# Patient Record
Sex: MALE | Race: WHITE | NOT HISPANIC OR LATINO | Employment: FULL TIME | URBAN - METROPOLITAN AREA
[De-identification: names, ages, dates, MRNs, and addresses within clinical notes are randomized per-mention and may not be internally consistent; named-entity substitution may affect disease eponyms.]

---

## 2018-07-02 ENCOUNTER — HOSPITAL ENCOUNTER (EMERGENCY)
Facility: HOSPITAL | Age: 43
Discharge: HOME | End: 2018-07-02
Attending: EMERGENCY MEDICINE
Payer: COMMERCIAL

## 2018-07-02 VITALS
OXYGEN SATURATION: 95 % | HEART RATE: 87 BPM | BODY MASS INDEX: 44.1 KG/M2 | HEIGHT: 71 IN | RESPIRATION RATE: 20 BRPM | DIASTOLIC BLOOD PRESSURE: 83 MMHG | WEIGHT: 315 LBS | TEMPERATURE: 98 F | SYSTOLIC BLOOD PRESSURE: 136 MMHG

## 2018-07-02 DIAGNOSIS — D58.2 ELEVATED HEMOGLOBIN: ICD-10-CM

## 2018-07-02 DIAGNOSIS — R71.8 ELEVATED HEMATOCRIT: ICD-10-CM

## 2018-07-02 DIAGNOSIS — R10.31 RLQ ABDOMINAL PAIN: Primary | ICD-10-CM

## 2018-07-02 DIAGNOSIS — R71.8 ELEVATED RED BLOOD CELL COUNT: ICD-10-CM

## 2018-07-02 LAB
ALBUMIN SERPL BCP-MCNC: 3.8 G/DL
ALP SERPL-CCNC: 70 U/L
ALT SERPL W/O P-5'-P-CCNC: 46 U/L
ANION GAP SERPL CALC-SCNC: 11 MMOL/L
AST SERPL-CCNC: 32 U/L
BASOPHILS # BLD AUTO: 0.03 K/UL
BASOPHILS NFR BLD: 0.2 %
BILIRUB SERPL-MCNC: 0.5 MG/DL
BILIRUB UR QL STRIP: NEGATIVE
BUN SERPL-MCNC: 9 MG/DL
CALCIUM SERPL-MCNC: 9.9 MG/DL
CHLORIDE SERPL-SCNC: 104 MMOL/L
CLARITY UR: CLEAR
CO2 SERPL-SCNC: 24 MMOL/L
COLOR UR: YELLOW
CREAT SERPL-MCNC: 0.9 MG/DL
DIFFERENTIAL METHOD: ABNORMAL
EOSINOPHIL # BLD AUTO: 0.2 K/UL
EOSINOPHIL NFR BLD: 1.8 %
ERYTHROCYTE [DISTWIDTH] IN BLOOD BY AUTOMATED COUNT: 13.4 %
EST. GFR  (AFRICAN AMERICAN): >60 ML/MIN/1.73 M^2
EST. GFR  (NON AFRICAN AMERICAN): >60 ML/MIN/1.73 M^2
GLUCOSE SERPL-MCNC: 127 MG/DL
GLUCOSE UR QL STRIP: NEGATIVE
HCT VFR BLD AUTO: 55.2 %
HGB BLD-MCNC: 18.9 G/DL
HGB UR QL STRIP: NEGATIVE
KETONES UR QL STRIP: NEGATIVE
LEUKOCYTE ESTERASE UR QL STRIP: NEGATIVE
LYMPHOCYTES # BLD AUTO: 3.4 K/UL
LYMPHOCYTES NFR BLD: 28.3 %
MCH RBC QN AUTO: 29.3 PG
MCHC RBC AUTO-ENTMCNC: 34.2 G/DL
MCV RBC AUTO: 86 FL
MONOCYTES # BLD AUTO: 0.4 K/UL
MONOCYTES NFR BLD: 3.2 %
NEUTROPHILS # BLD AUTO: 8 K/UL
NEUTROPHILS NFR BLD: 66.5 %
NITRITE UR QL STRIP: NEGATIVE
PH UR STRIP: 6 [PH] (ref 5–8)
PLATELET # BLD AUTO: 160 K/UL
PMV BLD AUTO: 11.5 FL
POTASSIUM SERPL-SCNC: 4 MMOL/L
PROT SERPL-MCNC: 7.7 G/DL
PROT UR QL STRIP: NEGATIVE
RBC # BLD AUTO: 6.45 M/UL
SODIUM SERPL-SCNC: 139 MMOL/L
SP GR UR STRIP: 1.02 (ref 1–1.03)
URN SPEC COLLECT METH UR: ABNORMAL
UROBILINOGEN UR STRIP-ACNC: ABNORMAL EU/DL
WBC # BLD AUTO: 12.05 K/UL

## 2018-07-02 PROCEDURE — 63600175 PHARM REV CODE 636 W HCPCS: Performed by: PHYSICIAN ASSISTANT

## 2018-07-02 PROCEDURE — 81003 URINALYSIS AUTO W/O SCOPE: CPT

## 2018-07-02 PROCEDURE — 80053 COMPREHEN METABOLIC PANEL: CPT

## 2018-07-02 PROCEDURE — 99284 EMERGENCY DEPT VISIT MOD MDM: CPT | Mod: 25

## 2018-07-02 PROCEDURE — 85025 COMPLETE CBC W/AUTO DIFF WBC: CPT

## 2018-07-02 PROCEDURE — 96374 THER/PROPH/DIAG INJ IV PUSH: CPT

## 2018-07-02 RX ORDER — PANTOPRAZOLE SODIUM 40 MG/1
40 TABLET, DELAYED RELEASE ORAL DAILY
COMMUNITY

## 2018-07-02 RX ORDER — KETOROLAC TROMETHAMINE 30 MG/ML
30 INJECTION, SOLUTION INTRAMUSCULAR; INTRAVENOUS
Status: COMPLETED | OUTPATIENT
Start: 2018-07-02 | End: 2018-07-02

## 2018-07-02 RX ADMIN — KETOROLAC TROMETHAMINE 30 MG: 30 INJECTION, SOLUTION INTRAMUSCULAR at 09:07

## 2018-07-02 NOTE — ED PROVIDER NOTES
Encounter Date: 7/2/2018    SCRIBE #1 NOTE: I, Bere Callahan, am scribing for, and in the presence of,  Tonia Sheikh PA-C. I have scribed the following portions of the note - Other sections scribed: HPI and ROS.       History     Chief Complaint   Patient presents with    Abdominal Pain     RLQ abdominal pain that woke him up. Denies pain at this time     CC: Abdominal Pain    HPI: This 42 y.o male who has GERD presents to the ED for an evaluation of acute onset right lower quadrant abdominal pain that began this evening.  Patient reports napping on his sofa, when he leaned back and suddenly felt a sharp pain to his right lower abdomen.  Patient rates the pain as a 10/10.  Patient reports palpating the area, when he noticed a pimple to skin of his right lower abdomen.  Patient reports squeezing the area and being able to express pus-like drainage from the pimple.  Patient reports upon arrival to the ED, he pain has started to subside.  Patient denies fever, chills, nausea, emesis, diarrhea, constipation, blood in stool, back pain, dysuria, hematuria, or any other associated symptoms.  Patient reports recently having a colonoscopy performed a few weeks ago, in which he reports the removal of 2 colon polyps.  He reports the polyps were benign.  No prior tx.        The history is provided by the patient. No  was used.     Review of patient's allergies indicates:  No Known Allergies  Past Medical History:   Diagnosis Date    GERD (gastroesophageal reflux disease)      History reviewed. No pertinent surgical history.  Family History   Problem Relation Age of Onset    Cancer Mother     Cancer Father     Cancer Brother      Social History   Substance Use Topics    Smoking status: Former Smoker     Packs/day: 1.00     Types: Cigarettes    Smokeless tobacco: Not on file    Alcohol use 0.5 oz/week     1 Standard drinks or equivalent per week     Review of Systems   Constitutional: Negative for  chills and fever.   HENT: Negative for ear pain and sore throat.    Eyes: Negative for pain.   Respiratory: Negative for cough and shortness of breath.    Cardiovascular: Negative for chest pain.   Gastrointestinal: Positive for abdominal pain. Negative for blood in stool, constipation, diarrhea, nausea and vomiting.   Genitourinary: Negative for difficulty urinating, dysuria, frequency and hematuria.   Musculoskeletal: Negative for back pain.   Skin: Negative for rash.   Neurological: Negative for headaches.       Physical Exam     Initial Vitals [07/02/18 1839]   BP Pulse Resp Temp SpO2   (!) 163/91 100 20 98.1 °F (36.7 °C) 97 %      MAP       --         Physical Exam    Nursing note and vitals reviewed.  Constitutional: Vital signs are normal. He appears well-developed and well-nourished. He is not diaphoretic. He is cooperative.  Non-toxic appearance. He does not have a sickly appearance. He does not appear ill. No distress.   HENT:   Head: Normocephalic and atraumatic.   Right Ear: External ear normal.   Left Ear: External ear normal.   Nose: Nose normal.   Mouth/Throat: Uvula is midline, oropharynx is clear and moist and mucous membranes are normal.   Eyes: Conjunctivae, EOM and lids are normal. Pupils are equal, round, and reactive to light.   Neck: Trachea normal, normal range of motion, full passive range of motion without pain and phonation normal. Neck supple.   Cardiovascular: Normal rate, regular rhythm, normal heart sounds and intact distal pulses. Exam reveals no gallop and no friction rub.    No murmur heard.  Pulmonary/Chest: Effort normal and breath sounds normal. No respiratory distress. He has no decreased breath sounds. He has no wheezes. He has no rhonchi. He has no rales.   Abdominal: Soft. Normal appearance and bowel sounds are normal. He exhibits no distension. There is tenderness in the right lower quadrant. There is no rigidity, no rebound, no guarding and no CVA tenderness.    Musculoskeletal: Normal range of motion.   Neurological: He is alert and oriented to person, place, and time. He has normal strength.   Skin: Skin is warm and dry. Capillary refill takes less than 2 seconds. No rash noted.   Psychiatric: He has a normal mood and affect. His speech is normal and behavior is normal. Judgment and thought content normal. Cognition and memory are normal.         ED Course   Procedures  Labs Reviewed   URINALYSIS - Abnormal; Notable for the following:        Result Value    Urobilinogen, UA 2.0-3.0 (*)     All other components within normal limits   CBC W/ AUTO DIFFERENTIAL - Abnormal; Notable for the following:     RBC 6.45 (*)     Hemoglobin 18.9 (*)     Hematocrit 55.2 (*)     Gran # (ANC) 8.0 (*)     Mono% 3.2 (*)     All other components within normal limits   COMPREHENSIVE METABOLIC PANEL - Abnormal; Notable for the following:     Glucose 127 (*)     ALT 46 (*)     All other components within normal limits          Imaging Results          CT Abdomen Pelvis  Without Contrast (Final result)  Result time 07/02/18 20:24:17    Final result by Jay Andrea MD (07/02/18 20:24:17)                 Impression:      1. No acute CT abnormality within the abdomen or pelvis correlate with right lower quadrant pain, no findings to suggest cholecystitis or appendicitis.  2. Hepatic steatosis, correlation with urinalysis recommended.  3. Several additional findings above.      Electronically signed by: Jay Andrea MD  Date:    07/02/2018  Time:    20:24             Narrative:    EXAMINATION:  CT ABDOMEN PELVIS WITHOUT CONTRAST    CLINICAL HISTORY:  RLQ pain;    TECHNIQUE:  Low dose axial images, sagittal and coronal reformations were obtained from the lung bases to the pubic symphysis.  Oral contrast was not administered.    COMPARISON:  None    FINDINGS:  Images of the lower thorax are unremarkable.    The liver is diffusely hypoattenuating, suggesting steatosis, correlation with LFTs  recommended.  The spleen, pancreas, adrenal glands and gallbladder are grossly unremarkable.  There is fatty sparing about the gallbladder fossa.  There are scattered nonenlarged gastrohepatic, harsh hepatic, and periaortic lymph nodes.  No ascites.  No biliary dilation.    The kidneys have a grossly unremarkable noncontrast appearance without hydronephrosis or nephrolithiasis.  The bilateral ureters are unremarkable without calculi seen.  The urinary bladder is grossly unremarkable.  The prostate is not enlarged.    The large bowel including the appendix is unremarkable.  The small bowel is grossly unremarkable.  Scattered nonenlarged mesenteric lymph nodes are noted.  There is atherosclerotic calcification of the aorta and its branches.  There are bilateral minimal fat containing hernias.    There are scattered osseous bone islands.  No focal osseous destructive process.  No significant inguinal lymphadenopathy.                                       APC / Resident Notes:   This is an evaluation of a 42 y.o. male that presents to the Emergency Department for abdominal pain. Patient reports acute onset of right lower quadrant pain this afternoon.  He describes the pain as severe (10/10) cramping with the onset, but states that the pain has resolved since that time. He denies any trauma or injury. He denies fever, chills, diaphoresis, SOB, chest pain, nausea, emesis, diarrhea, constipation, urinary symptoms or further complaints. He denies attempted tx prior to arrival. He reports seen gastroenterologist 1 week ago for colonoscopy and polyp biopsy which were benign.    Physical Exam shows a non-toxic, afebrile, and well appearing male.  There is tenderness to palpation of the right lower quadrant.  No peritoneal signs.  Bowel sounds are appreciated. The remainder exam unremarkable.    Vital Signs Are Reassuring. If available, previous records reviewed.   RESULTS:   CBC shows elevation of RBC (6/45) and H&H (18.9 and  55.2); unrevealing for leukocytosis or anemia.   CMP unrevealing for electrolyte abnormality, transaminitis, acute kidney injury.  Urinalysis unrevealing for UTI.  CT abdomen and pelvis unrevealing for acute abdominal process.    My overall impression is abdominal pain and elevated hematocrit, elevated hemoglobin, elevated RBC.  DDx: abdominal pain, RLQ pain, appendicitis, kidney stone, UTI    ED Course: Toradol IV given. I have discussed lab findings with the patient and recommended close follow-up with PCP. I feel this patient is stable for discharge. I will recommend Motrin or Naproxen as needed for pain.  The diagnosis, treatment plan, instructions for follow-up and reevaluation with PCP, as well as ED return precautions were discussed and understanding was verbalized. All questions or concerns have been addressed. Patient was discharged home with an instructional sheet which gave not only information regarding the most likely diagnoses but also information regarding when to return to the emergency department for alarming symptoms and when to seek further care.      This case was discussed with Dr. Gibson who is in agreement with my assessment and plan.     Tonia Waterman PA-C         Scribe Attestation:   Scribe #1: I performed the above scribed service and the documentation accurately describes the services I performed. I attest to the accuracy of the note.    Attending Attestation:     Physician Attestation Statement for NP/PA:   I have conducted a face to face encounter with this patient in addition to the NP/PA, due to NP/PA Request    Other NP/PA Attestation Additions:    History of Present Illness: 42 year old white male with pain in the RLQ  It awoke him from a nap this afternoon and was a 10/10 and is now a 5/10   Physical Exam: Abdomen bowel sounds present soft tender with guarding in the RLQ but no rebound. Testicles are nontender.        Physician Attestation for Scribe:  Physician Attestation  Statement for Scribe #1: I, Tonia Sheikh PA-C, reviewed documentation, as scribed by Bere Callahan in my presence, and it is both accurate and complete.                    Clinical Impression:   The primary encounter diagnosis was RLQ abdominal pain. Diagnoses of Elevated hematocrit, Elevated hemoglobin, and Elevated red blood cell count were also pertinent to this visit.      Disposition:   Disposition: Discharged  Condition: Stable                        Tonia Sheikh PA-C  07/02/18 [id number]

## 2018-07-03 NOTE — DISCHARGE INSTRUCTIONS
Your CT scan was negative for acute abdominal emergency.  Your lab work showed elevated hemoglobin and hematocrit.  It is important that he follow up with her primary care provider within the next week regarding this.    You can take Naproxen or Ibuprofen as needed for pain.    Please avoid strenuous physical activity.    Please make sure you are drinking plenty of fluids.    Return to the emergency department for any new or concerning symptoms.

## 2018-07-03 NOTE — ED NOTES
Patient discharged home w/ family. Discharge instructions reviewed, patient verbalized understanding.

## 2021-08-01 ENCOUNTER — HOSPITAL ENCOUNTER (OUTPATIENT)
Facility: HOSPITAL | Age: 46
Discharge: HOME | End: 2021-08-02
Attending: EMERGENCY MEDICINE | Admitting: STUDENT IN AN ORGANIZED HEALTH CARE EDUCATION/TRAINING PROGRAM
Payer: COMMERCIAL

## 2021-08-01 DIAGNOSIS — G45.1 TIA INVOLVING CAROTID ARTERY: Primary | ICD-10-CM

## 2021-08-01 DIAGNOSIS — R29.90 STROKE-LIKE EPISODE: ICD-10-CM

## 2021-08-01 DIAGNOSIS — I63.9 STROKE: ICD-10-CM

## 2021-08-01 DIAGNOSIS — R29.90 STROKE-LIKE SYMPTOMS: ICD-10-CM

## 2021-08-01 PROBLEM — Z72.0 TOBACCO USE: Chronic | Status: ACTIVE | Noted: 2021-08-01

## 2021-08-01 PROBLEM — E66.9 CLASS 2 OBESITY IN ADULT: Chronic | Status: ACTIVE | Noted: 2021-08-01

## 2021-08-01 LAB
ALBUMIN SERPL BCP-MCNC: 3.6 G/DL (ref 3.5–5.2)
ALP SERPL-CCNC: 92 U/L (ref 55–135)
ALT SERPL W/O P-5'-P-CCNC: 40 U/L (ref 10–44)
ANION GAP SERPL CALC-SCNC: 12 MMOL/L (ref 8–16)
ANION GAP SERPL CALC-SCNC: 19 MMOL/L (ref 8–16)
APTT BLDCRRT: 26.5 SEC (ref 21–32)
AST SERPL-CCNC: 30 U/L (ref 10–40)
BASOPHILS # BLD AUTO: 0.09 K/UL (ref 0–0.2)
BASOPHILS NFR BLD: 0.6 % (ref 0–1.9)
BILIRUB SERPL-MCNC: 0.4 MG/DL (ref 0.1–1)
BUN SERPL-MCNC: 10 MG/DL (ref 6–20)
BUN SERPL-MCNC: 11 MG/DL (ref 6–30)
CALCIUM SERPL-MCNC: 9.7 MG/DL (ref 8.7–10.5)
CHLORIDE SERPL-SCNC: 102 MMOL/L (ref 95–110)
CHLORIDE SERPL-SCNC: 106 MMOL/L (ref 95–110)
CHOLEST SERPL-MCNC: 218 MG/DL (ref 120–199)
CHOLEST/HDLC SERPL: 6.6 {RATIO} (ref 2–5)
CO2 SERPL-SCNC: 22 MMOL/L (ref 23–29)
CREAT SERPL-MCNC: 0.9 MG/DL (ref 0.5–1.4)
CREAT SERPL-MCNC: 1 MG/DL (ref 0.5–1.4)
CTP QC/QA: YES
DIFFERENTIAL METHOD: ABNORMAL
EOSINOPHIL # BLD AUTO: 0.4 K/UL (ref 0–0.5)
EOSINOPHIL NFR BLD: 2.7 % (ref 0–8)
ERYTHROCYTE [DISTWIDTH] IN BLOOD BY AUTOMATED COUNT: 12.6 % (ref 11.5–14.5)
EST. GFR  (AFRICAN AMERICAN): >60 ML/MIN/1.73 M^2
EST. GFR  (NON AFRICAN AMERICAN): >60 ML/MIN/1.73 M^2
GLUCOSE SERPL-MCNC: 186 MG/DL (ref 70–110)
GLUCOSE SERPL-MCNC: 219 MG/DL (ref 70–110)
GLUCOSE SERPL-MCNC: 221 MG/DL (ref 70–110)
HCT VFR BLD AUTO: 46.2 % (ref 40–54)
HCT VFR BLD CALC: 47 %PCV (ref 36–54)
HDLC SERPL-MCNC: 33 MG/DL (ref 40–75)
HDLC SERPL: 15.1 % (ref 20–50)
HGB BLD-MCNC: 16.2 G/DL (ref 14–18)
IMM GRANULOCYTES # BLD AUTO: 0.08 K/UL (ref 0–0.04)
IMM GRANULOCYTES NFR BLD AUTO: 0.5 % (ref 0–0.5)
INR PPP: 1 (ref 0.8–1.2)
LDLC SERPL CALC-MCNC: 128.8 MG/DL (ref 63–159)
LYMPHOCYTES # BLD AUTO: 5.1 K/UL (ref 1–4.8)
LYMPHOCYTES NFR BLD: 31.3 % (ref 18–48)
MCH RBC QN AUTO: 30.3 PG (ref 27–31)
MCHC RBC AUTO-ENTMCNC: 35.1 G/DL (ref 32–36)
MCV RBC AUTO: 86 FL (ref 82–98)
MONOCYTES # BLD AUTO: 0.6 K/UL (ref 0.3–1)
MONOCYTES NFR BLD: 3.7 % (ref 4–15)
NEUTROPHILS # BLD AUTO: 9.9 K/UL (ref 1.8–7.7)
NEUTROPHILS NFR BLD: 61.2 % (ref 38–73)
NONHDLC SERPL-MCNC: 185 MG/DL
NRBC BLD-RTO: 0 /100 WBC
PLATELET # BLD AUTO: 219 K/UL (ref 150–450)
PMV BLD AUTO: 11.9 FL (ref 9.2–12.9)
POC IONIZED CALCIUM: 1.27 MMOL/L (ref 1.06–1.42)
POC TCO2 (MEASURED): 24 MMOL/L (ref 23–29)
POCT GLUCOSE: 186 MG/DL (ref 70–110)
POCT GLUCOSE: 199 MG/DL (ref 70–110)
POTASSIUM BLD-SCNC: 3.9 MMOL/L (ref 3.5–5.1)
POTASSIUM SERPL-SCNC: 4 MMOL/L (ref 3.5–5.1)
PROT SERPL-MCNC: 7.3 G/DL (ref 6–8.4)
PROTHROMBIN TIME: 10.3 SEC (ref 9–12.5)
RBC # BLD AUTO: 5.35 M/UL (ref 4.6–6.2)
SAMPLE: ABNORMAL
SARS-COV-2 RDRP RESP QL NAA+PROBE: NEGATIVE
SODIUM BLD-SCNC: 140 MMOL/L (ref 136–145)
SODIUM SERPL-SCNC: 140 MMOL/L (ref 136–145)
TRIGL SERPL-MCNC: 281 MG/DL (ref 30–150)
TROPONIN I SERPL DL<=0.01 NG/ML-MCNC: <0.006 NG/ML (ref 0–0.03)
TSH SERPL DL<=0.005 MIU/L-ACNC: 1.89 UIU/ML (ref 0.4–4)
WBC # BLD AUTO: 16.12 K/UL (ref 3.9–12.7)

## 2021-08-01 PROCEDURE — 99285 EMERGENCY DEPT VISIT HI MDM: CPT | Mod: 25

## 2021-08-01 PROCEDURE — 96372 THER/PROPH/DIAG INJ SC/IM: CPT | Mod: 59

## 2021-08-01 PROCEDURE — 93010 EKG 12-LEAD: ICD-10-PCS | Mod: ,,, | Performed by: INTERNAL MEDICINE

## 2021-08-01 PROCEDURE — 25000003 PHARM REV CODE 250: Performed by: EMERGENCY MEDICINE

## 2021-08-01 PROCEDURE — 93005 ELECTROCARDIOGRAM TRACING: CPT

## 2021-08-01 PROCEDURE — 93010 ELECTROCARDIOGRAM REPORT: CPT | Mod: ,,, | Performed by: INTERNAL MEDICINE

## 2021-08-01 PROCEDURE — 80061 LIPID PANEL: CPT | Performed by: EMERGENCY MEDICINE

## 2021-08-01 PROCEDURE — 25500020 PHARM REV CODE 255: Performed by: EMERGENCY MEDICINE

## 2021-08-01 PROCEDURE — 82565 ASSAY OF CREATININE: CPT

## 2021-08-01 PROCEDURE — 82330 ASSAY OF CALCIUM: CPT

## 2021-08-01 PROCEDURE — U0002 COVID-19 LAB TEST NON-CDC: HCPCS | Performed by: EMERGENCY MEDICINE

## 2021-08-01 PROCEDURE — 80053 COMPREHEN METABOLIC PANEL: CPT | Performed by: EMERGENCY MEDICINE

## 2021-08-01 PROCEDURE — 84295 ASSAY OF SERUM SODIUM: CPT

## 2021-08-01 PROCEDURE — 83036 HEMOGLOBIN GLYCOSYLATED A1C: CPT | Performed by: EMERGENCY MEDICINE

## 2021-08-01 PROCEDURE — 99205 PR OFFICE/OUTPT VISIT, NEW, LEVL V, 60-74 MIN: ICD-10-PCS | Mod: ,,, | Performed by: PSYCHIATRY & NEUROLOGY

## 2021-08-01 PROCEDURE — 85025 COMPLETE CBC W/AUTO DIFF WBC: CPT | Performed by: EMERGENCY MEDICINE

## 2021-08-01 PROCEDURE — 84132 ASSAY OF SERUM POTASSIUM: CPT

## 2021-08-01 PROCEDURE — 36000 PLACE NEEDLE IN VEIN: CPT

## 2021-08-01 PROCEDURE — 84484 ASSAY OF TROPONIN QUANT: CPT | Performed by: EMERGENCY MEDICINE

## 2021-08-01 PROCEDURE — 85610 PROTHROMBIN TIME: CPT | Performed by: EMERGENCY MEDICINE

## 2021-08-01 PROCEDURE — 82962 GLUCOSE BLOOD TEST: CPT

## 2021-08-01 PROCEDURE — 63600175 PHARM REV CODE 636 W HCPCS: Performed by: HOSPITALIST

## 2021-08-01 PROCEDURE — 85730 THROMBOPLASTIN TIME PARTIAL: CPT | Performed by: EMERGENCY MEDICINE

## 2021-08-01 PROCEDURE — 84443 ASSAY THYROID STIM HORMONE: CPT | Performed by: EMERGENCY MEDICINE

## 2021-08-01 PROCEDURE — 99205 OFFICE O/P NEW HI 60 MIN: CPT | Mod: ,,, | Performed by: PSYCHIATRY & NEUROLOGY

## 2021-08-01 PROCEDURE — 99900035 HC TECH TIME PER 15 MIN (STAT)

## 2021-08-01 PROCEDURE — 85014 HEMATOCRIT: CPT

## 2021-08-01 RX ORDER — ATORVASTATIN CALCIUM 40 MG/1
80 TABLET, FILM COATED ORAL ONCE
Status: COMPLETED | OUTPATIENT
Start: 2021-08-01 | End: 2021-08-01

## 2021-08-01 RX ORDER — ASPIRIN 81 MG/1
81 TABLET ORAL DAILY
Status: DISCONTINUED | OUTPATIENT
Start: 2021-08-02 | End: 2021-08-02 | Stop reason: HOSPADM

## 2021-08-01 RX ORDER — TALC
6 POWDER (GRAM) TOPICAL NIGHTLY PRN
Status: DISCONTINUED | OUTPATIENT
Start: 2021-08-01 | End: 2021-08-02 | Stop reason: HOSPADM

## 2021-08-01 RX ORDER — CLOPIDOGREL 300 MG/1
300 TABLET, FILM COATED ORAL ONCE
Status: COMPLETED | OUTPATIENT
Start: 2021-08-01 | End: 2021-08-01

## 2021-08-01 RX ORDER — LABETALOL HYDROCHLORIDE 5 MG/ML
10 INJECTION, SOLUTION INTRAVENOUS
Status: DISCONTINUED | OUTPATIENT
Start: 2021-08-01 | End: 2021-08-02 | Stop reason: HOSPADM

## 2021-08-01 RX ORDER — ENOXAPARIN SODIUM 100 MG/ML
40 INJECTION SUBCUTANEOUS EVERY 24 HOURS
Status: DISCONTINUED | OUTPATIENT
Start: 2021-08-01 | End: 2021-08-02 | Stop reason: HOSPADM

## 2021-08-01 RX ORDER — ATORVASTATIN CALCIUM 10 MG/1
40 TABLET, FILM COATED ORAL DAILY
Status: DISCONTINUED | OUTPATIENT
Start: 2021-08-02 | End: 2021-08-02 | Stop reason: HOSPADM

## 2021-08-01 RX ORDER — CLOPIDOGREL BISULFATE 75 MG/1
75 TABLET ORAL DAILY
Status: DISCONTINUED | OUTPATIENT
Start: 2021-08-02 | End: 2021-08-02 | Stop reason: HOSPADM

## 2021-08-01 RX ORDER — SODIUM CHLORIDE 0.9 % (FLUSH) 0.9 %
10 SYRINGE (ML) INJECTION
Status: DISCONTINUED | OUTPATIENT
Start: 2021-08-01 | End: 2021-08-02 | Stop reason: HOSPADM

## 2021-08-01 RX ORDER — ASPIRIN 325 MG
325 TABLET ORAL
Status: COMPLETED | OUTPATIENT
Start: 2021-08-01 | End: 2021-08-01

## 2021-08-01 RX ORDER — ACETAMINOPHEN 325 MG/1
650 TABLET ORAL EVERY 6 HOURS PRN
Status: DISCONTINUED | OUTPATIENT
Start: 2021-08-01 | End: 2021-08-02 | Stop reason: HOSPADM

## 2021-08-01 RX ORDER — PANTOPRAZOLE SODIUM 40 MG/1
40 TABLET, DELAYED RELEASE ORAL DAILY
Status: DISCONTINUED | OUTPATIENT
Start: 2021-08-02 | End: 2021-08-02 | Stop reason: HOSPADM

## 2021-08-01 RX ORDER — LORAZEPAM 2 MG/ML
1 INJECTION INTRAMUSCULAR
Status: DISCONTINUED | OUTPATIENT
Start: 2021-08-01 | End: 2021-08-02 | Stop reason: HOSPADM

## 2021-08-01 RX ADMIN — ENOXAPARIN SODIUM 40 MG: 100 INJECTION SUBCUTANEOUS at 09:08

## 2021-08-01 RX ADMIN — CLOPIDOGREL BISULFATE 300 MG: 300 TABLET, FILM COATED ORAL at 07:08

## 2021-08-01 RX ADMIN — ASPIRIN 325 MG ORAL TABLET 325 MG: 325 PILL ORAL at 07:08

## 2021-08-01 RX ADMIN — IOHEXOL 75 ML: 350 INJECTION, SOLUTION INTRAVENOUS at 06:08

## 2021-08-01 RX ADMIN — ATORVASTATIN CALCIUM 80 MG: 40 TABLET, FILM COATED ORAL at 07:08

## 2021-08-02 VITALS
DIASTOLIC BLOOD PRESSURE: 77 MMHG | TEMPERATURE: 98 F | BODY MASS INDEX: 39.51 KG/M2 | RESPIRATION RATE: 15 BRPM | WEIGHT: 282.19 LBS | HEART RATE: 75 BPM | OXYGEN SATURATION: 97 % | SYSTOLIC BLOOD PRESSURE: 161 MMHG | HEIGHT: 71 IN

## 2021-08-02 LAB
ALBUMIN SERPL BCP-MCNC: 3.3 G/DL (ref 3.5–5.2)
ALP SERPL-CCNC: 83 U/L (ref 55–135)
ALT SERPL W/O P-5'-P-CCNC: 34 U/L (ref 10–44)
ANION GAP SERPL CALC-SCNC: 6 MMOL/L (ref 8–16)
AORTIC ROOT ANNULUS: 2.82 CM
AORTIC VALVE CUSP SEPERATION: 2.34 CM
APTT BLDCRRT: 27.9 SEC (ref 21–32)
ASCENDING AORTA: 3.27 CM
AST SERPL-CCNC: 22 U/L (ref 10–40)
AV INDEX (PROSTH): 0.89
AV MEAN GRADIENT: 4 MMHG
AV PEAK GRADIENT: 6 MMHG
AV VALVE AREA: 3.85 CM2
AV VELOCITY RATIO: 0.9
BASOPHILS # BLD AUTO: 0.09 K/UL (ref 0–0.2)
BASOPHILS NFR BLD: 0.6 % (ref 0–1.9)
BILIRUB SERPL-MCNC: 0.4 MG/DL (ref 0.1–1)
BSA FOR ECHO PROCEDURE: 2.53 M2
BUN SERPL-MCNC: 11 MG/DL (ref 6–20)
CALCIUM SERPL-MCNC: 9.6 MG/DL (ref 8.7–10.5)
CHLORIDE SERPL-SCNC: 105 MMOL/L (ref 95–110)
CO2 SERPL-SCNC: 28 MMOL/L (ref 23–29)
CREAT SERPL-MCNC: 1 MG/DL (ref 0.5–1.4)
CV ECHO LV RWT: 0.58 CM
DIFFERENTIAL METHOD: ABNORMAL
DOP CALC AO PEAK VEL: 1.26 M/S
DOP CALC AO VTI: 26.26 CM
DOP CALC LVOT AREA: 4.3 CM2
DOP CALC LVOT DIAMETER: 2.35 CM
DOP CALC LVOT PEAK VEL: 1.13 M/S
DOP CALC LVOT STROKE VOLUME: 101.23 CM3
DOP CALCLVOT PEAK VEL VTI: 23.35 CM
E WAVE DECELERATION TIME: 148.24 MSEC
E/A RATIO: 1.6
E/E' RATIO: 12 M/S
ECHO LV POSTERIOR WALL: 1.27 CM (ref 0.6–1.1)
EJECTION FRACTION: 60 %
EOSINOPHIL # BLD AUTO: 0.4 K/UL (ref 0–0.5)
EOSINOPHIL NFR BLD: 2.8 % (ref 0–8)
ERYTHROCYTE [DISTWIDTH] IN BLOOD BY AUTOMATED COUNT: 12.8 % (ref 11.5–14.5)
EST. GFR  (AFRICAN AMERICAN): >60 ML/MIN/1.73 M^2
EST. GFR  (NON AFRICAN AMERICAN): >60 ML/MIN/1.73 M^2
ESTIMATED AVG GLUCOSE: 203 MG/DL (ref 68–131)
FRACTIONAL SHORTENING: 29 % (ref 28–44)
GLUCOSE SERPL-MCNC: 259 MG/DL (ref 70–110)
HBA1C MFR BLD: 8.7 % (ref 4–5.6)
HCT VFR BLD AUTO: 45.6 % (ref 40–54)
HGB BLD-MCNC: 14.9 G/DL (ref 14–18)
IMM GRANULOCYTES # BLD AUTO: 0.09 K/UL (ref 0–0.04)
IMM GRANULOCYTES NFR BLD AUTO: 0.6 % (ref 0–0.5)
INR PPP: 1 (ref 0.8–1.2)
INTERVENTRICULAR SEPTUM: 1.26 CM (ref 0.6–1.1)
IVRT: 89.97 MSEC
LA MAJOR: 5.69 CM
LA MINOR: 6.08 CM
LA WIDTH: 4.09 CM
LEFT ATRIUM SIZE: 4.11 CM
LEFT ATRIUM VOLUME INDEX: 34.4 ML/M2
LEFT ATRIUM VOLUME: 83.99 CM3
LEFT INTERNAL DIMENSION IN SYSTOLE: 3.12 CM (ref 2.1–4)
LEFT VENTRICLE DIASTOLIC VOLUME INDEX: 35.59 ML/M2
LEFT VENTRICLE DIASTOLIC VOLUME: 86.85 ML
LEFT VENTRICLE MASS INDEX: 84 G/M2
LEFT VENTRICLE SYSTOLIC VOLUME INDEX: 15.8 ML/M2
LEFT VENTRICLE SYSTOLIC VOLUME: 38.46 ML
LEFT VENTRICULAR INTERNAL DIMENSION IN DIASTOLE: 4.38 CM (ref 3.5–6)
LEFT VENTRICULAR MASS: 205.2 G
LV LATERAL E/E' RATIO: 10.67 M/S
LV SEPTAL E/E' RATIO: 13.71 M/S
LYMPHOCYTES # BLD AUTO: 4.5 K/UL (ref 1–4.8)
LYMPHOCYTES NFR BLD: 32.1 % (ref 18–48)
MAGNESIUM SERPL-MCNC: 1.9 MG/DL (ref 1.6–2.6)
MCH RBC QN AUTO: 29.6 PG (ref 27–31)
MCHC RBC AUTO-ENTMCNC: 32.7 G/DL (ref 32–36)
MCV RBC AUTO: 91 FL (ref 82–98)
MONOCYTES # BLD AUTO: 0.6 K/UL (ref 0.3–1)
MONOCYTES NFR BLD: 4.3 % (ref 4–15)
MV PEAK A VEL: 0.6 M/S
MV PEAK E VEL: 0.96 M/S
NEUTROPHILS # BLD AUTO: 8.3 K/UL (ref 1.8–7.7)
NEUTROPHILS NFR BLD: 59.6 % (ref 38–73)
NRBC BLD-RTO: 0 /100 WBC
PHOSPHATE SERPL-MCNC: 3.4 MG/DL (ref 2.7–4.5)
PISA TR MAX VEL: 1.36 M/S
PLATELET # BLD AUTO: 186 K/UL (ref 150–450)
PMV BLD AUTO: 11.7 FL (ref 9.2–12.9)
POTASSIUM SERPL-SCNC: 4.3 MMOL/L (ref 3.5–5.1)
PROT SERPL-MCNC: 6.6 G/DL (ref 6–8.4)
PROTHROMBIN TIME: 10.5 SEC (ref 9–12.5)
PV PEAK VELOCITY: 1.07 CM/S
RA MAJOR: 4.72 CM
RA PRESSURE: 3 MMHG
RA WIDTH: 3.37 CM
RBC # BLD AUTO: 5.04 M/UL (ref 4.6–6.2)
RIGHT VENTRICULAR END-DIASTOLIC DIMENSION: 3.64 CM
RV TISSUE DOPPLER FREE WALL SYSTOLIC VELOCITY 1 (APICAL 4 CHAMBER VIEW): 10.11 CM/S
SINUS: 3.38 CM
SODIUM SERPL-SCNC: 139 MMOL/L (ref 136–145)
STJ: 3.2 CM
TDI LATERAL: 0.09 M/S
TDI SEPTAL: 0.07 M/S
TDI: 0.08 M/S
TR MAX PG: 7 MMHG
TRICUSPID ANNULAR PLANE SYSTOLIC EXCURSION: 2.53 CM
TV REST PULMONARY ARTERY PRESSURE: 10 MMHG
WBC # BLD AUTO: 13.88 K/UL (ref 3.9–12.7)

## 2021-08-02 PROCEDURE — 99214 PR OFFICE/OUTPT VISIT, EST, LEVL IV, 30-39 MIN: ICD-10-PCS | Mod: ,,, | Performed by: PSYCHIATRY & NEUROLOGY

## 2021-08-02 PROCEDURE — 99214 OFFICE O/P EST MOD 30 MIN: CPT | Mod: ,,, | Performed by: PSYCHIATRY & NEUROLOGY

## 2021-08-02 PROCEDURE — 25000003 PHARM REV CODE 250: Performed by: NURSE PRACTITIONER

## 2021-08-02 PROCEDURE — 85025 COMPLETE CBC W/AUTO DIFF WBC: CPT | Performed by: HOSPITALIST

## 2021-08-02 PROCEDURE — 83735 ASSAY OF MAGNESIUM: CPT | Performed by: HOSPITALIST

## 2021-08-02 PROCEDURE — 97161 PT EVAL LOW COMPLEX 20 MIN: CPT

## 2021-08-02 PROCEDURE — 80053 COMPREHEN METABOLIC PANEL: CPT | Performed by: HOSPITALIST

## 2021-08-02 PROCEDURE — 85730 THROMBOPLASTIN TIME PARTIAL: CPT | Performed by: HOSPITALIST

## 2021-08-02 PROCEDURE — 85610 PROTHROMBIN TIME: CPT | Performed by: HOSPITALIST

## 2021-08-02 PROCEDURE — 84100 ASSAY OF PHOSPHORUS: CPT | Performed by: HOSPITALIST

## 2021-08-02 PROCEDURE — 25000003 PHARM REV CODE 250: Performed by: HOSPITALIST

## 2021-08-02 RX ORDER — LISINOPRIL 20 MG/1
20 TABLET ORAL DAILY
Status: DISCONTINUED | OUTPATIENT
Start: 2021-08-02 | End: 2021-08-02 | Stop reason: HOSPADM

## 2021-08-02 RX ORDER — LISINOPRIL 20 MG/1
20 TABLET ORAL DAILY
Qty: 90 TABLET | Refills: 3 | Status: SHIPPED | OUTPATIENT
Start: 2021-08-02 | End: 2022-01-19

## 2021-08-02 RX ORDER — METFORMIN HYDROCHLORIDE 500 MG/1
500 TABLET ORAL 2 TIMES DAILY WITH MEALS
Qty: 60 TABLET | Refills: 1 | Status: SHIPPED | OUTPATIENT
Start: 2021-08-02 | End: 2021-09-01

## 2021-08-02 RX ORDER — ASPIRIN 81 MG/1
81 TABLET ORAL DAILY
Qty: 30 TABLET | Refills: 0 | Status: SHIPPED | OUTPATIENT
Start: 2021-08-03 | End: 2021-09-02

## 2021-08-02 RX ORDER — ATORVASTATIN CALCIUM 40 MG/1
40 TABLET, FILM COATED ORAL DAILY
Qty: 90 TABLET | Refills: 3 | Status: SHIPPED | OUTPATIENT
Start: 2021-08-02 | End: 2021-08-02

## 2021-08-02 RX ORDER — ATORVASTATIN CALCIUM 20 MG/1
20 TABLET, FILM COATED ORAL DAILY
Qty: 90 TABLET | Refills: 3 | Status: ON HOLD | OUTPATIENT
Start: 2021-08-02 | End: 2022-01-20 | Stop reason: SDUPTHER

## 2021-08-02 RX ORDER — CLOPIDOGREL BISULFATE 75 MG/1
75 TABLET ORAL DAILY
Qty: 20 TABLET | Refills: 0 | Status: ON HOLD | OUTPATIENT
Start: 2021-08-03 | End: 2022-01-20 | Stop reason: HOSPADM

## 2021-08-02 RX ADMIN — ASPIRIN 81 MG: 81 TABLET, COATED ORAL at 12:08

## 2021-08-02 RX ADMIN — CLOPIDOGREL 75 MG: 75 TABLET, FILM COATED ORAL at 12:08

## 2021-08-02 RX ADMIN — LISINOPRIL 20 MG: 20 TABLET ORAL at 12:08

## 2021-08-02 RX ADMIN — ATORVASTATIN CALCIUM 40 MG: 10 TABLET, FILM COATED ORAL at 12:08

## 2021-08-02 RX ADMIN — PANTOPRAZOLE SODIUM 40 MG: 40 TABLET, DELAYED RELEASE ORAL at 12:08

## 2021-08-05 ENCOUNTER — HOSPITAL ENCOUNTER (EMERGENCY)
Facility: HOSPITAL | Age: 46
Discharge: HOME | End: 2021-08-05
Attending: EMERGENCY MEDICINE
Payer: COMMERCIAL

## 2021-08-05 VITALS
RESPIRATION RATE: 16 BRPM | WEIGHT: 280 LBS | HEIGHT: 71 IN | SYSTOLIC BLOOD PRESSURE: 145 MMHG | DIASTOLIC BLOOD PRESSURE: 68 MMHG | BODY MASS INDEX: 39.2 KG/M2 | HEART RATE: 87 BPM | TEMPERATURE: 99 F | OXYGEN SATURATION: 97 %

## 2021-08-05 DIAGNOSIS — R20.0 NUMBNESS: ICD-10-CM

## 2021-08-05 DIAGNOSIS — G45.9 TIA (TRANSIENT ISCHEMIC ATTACK): ICD-10-CM

## 2021-08-05 DIAGNOSIS — R53.1 WEAKNESS: Primary | ICD-10-CM

## 2021-08-05 LAB — POCT GLUCOSE: 119 MG/DL (ref 70–110)

## 2021-08-05 PROCEDURE — 91303 PHARM REV CODE 636 W HCPCS: CPT | Performed by: PSYCHIATRY & NEUROLOGY

## 2021-08-05 PROCEDURE — 99214 PR OFFICE/OUTPT VISIT, EST, LEVL IV, 30-39 MIN: ICD-10-PCS | Mod: GT,,, | Performed by: PSYCHIATRY & NEUROLOGY

## 2021-08-05 PROCEDURE — 82962 GLUCOSE BLOOD TEST: CPT

## 2021-08-05 PROCEDURE — 63600175 PHARM REV CODE 636 W HCPCS: Performed by: PSYCHIATRY & NEUROLOGY

## 2021-08-05 PROCEDURE — 99214 OFFICE O/P EST MOD 30 MIN: CPT | Mod: GT,,, | Performed by: PSYCHIATRY & NEUROLOGY

## 2021-08-05 PROCEDURE — 99284 EMERGENCY DEPT VISIT MOD MDM: CPT | Mod: 25

## 2021-08-05 PROCEDURE — 0031A HC IMMUNIZ ADMIN, SARS-COV-2 COVID-19 VACC, 5X10VP/0.5ML: CPT | Performed by: PSYCHIATRY & NEUROLOGY

## 2021-08-05 RX ADMIN — JNJ-78436735 0.5 ML: 50000000000 SUSPENSION INTRAMUSCULAR at 06:08

## 2021-08-06 ENCOUNTER — TELEPHONE (OUTPATIENT)
Dept: NEUROLOGY | Facility: CLINIC | Age: 46
End: 2021-08-06

## 2022-01-19 ENCOUNTER — HOSPITAL ENCOUNTER (OUTPATIENT)
Facility: HOSPITAL | Age: 47
Discharge: HOME | End: 2022-01-20
Attending: EMERGENCY MEDICINE | Admitting: STUDENT IN AN ORGANIZED HEALTH CARE EDUCATION/TRAINING PROGRAM
Payer: COMMERCIAL

## 2022-01-19 DIAGNOSIS — R07.9 CHEST PAIN: ICD-10-CM

## 2022-01-19 PROBLEM — Z86.73 HISTORY OF TIA (TRANSIENT ISCHEMIC ATTACK): Status: ACTIVE | Noted: 2022-01-19

## 2022-01-19 PROBLEM — I10 PRIMARY HYPERTENSION: Status: ACTIVE | Noted: 2022-01-19

## 2022-01-19 PROBLEM — E11.9 TYPE 2 DIABETES MELLITUS: Status: ACTIVE | Noted: 2022-01-19

## 2022-01-19 PROBLEM — G43.009 MIGRAINE WITHOUT AURA, NOT INTRACTABLE: Status: ACTIVE | Noted: 2022-01-19

## 2022-01-19 PROBLEM — R56.9 SEIZURES: Status: ACTIVE | Noted: 2022-01-19

## 2022-01-19 PROBLEM — K21.9 GERD (GASTROESOPHAGEAL REFLUX DISEASE): Status: ACTIVE | Noted: 2022-01-19

## 2022-01-19 PROBLEM — Z72.0 TOBACCO USE: Chronic | Status: RESOLVED | Noted: 2021-08-01 | Resolved: 2022-01-19

## 2022-01-19 PROBLEM — E78.49 OTHER HYPERLIPIDEMIA: Status: ACTIVE | Noted: 2022-01-19

## 2022-01-19 LAB
ALBUMIN SERPL BCP-MCNC: 3.9 G/DL (ref 3.5–5.2)
ALP SERPL-CCNC: 85 U/L (ref 55–135)
ALT SERPL W/O P-5'-P-CCNC: 49 U/L (ref 10–44)
ANION GAP SERPL CALC-SCNC: 10 MMOL/L (ref 8–16)
AST SERPL-CCNC: 35 U/L (ref 10–40)
BASOPHILS # BLD AUTO: 0.08 K/UL (ref 0–0.2)
BASOPHILS NFR BLD: 0.7 % (ref 0–1.9)
BILIRUB SERPL-MCNC: 0.6 MG/DL (ref 0.1–1)
BNP SERPL-MCNC: <10 PG/ML (ref 0–99)
BUN SERPL-MCNC: 12 MG/DL (ref 6–20)
CALCIUM SERPL-MCNC: 10 MG/DL (ref 8.7–10.5)
CHLORIDE SERPL-SCNC: 98 MMOL/L (ref 95–110)
CK SERPL-CCNC: 104 U/L (ref 20–200)
CO2 SERPL-SCNC: 26 MMOL/L (ref 23–29)
CREAT SERPL-MCNC: 0.8 MG/DL (ref 0.5–1.4)
CTP QC/QA: YES
DIFFERENTIAL METHOD: ABNORMAL
EOSINOPHIL # BLD AUTO: 0.3 K/UL (ref 0–0.5)
EOSINOPHIL NFR BLD: 2.1 % (ref 0–8)
ERYTHROCYTE [DISTWIDTH] IN BLOOD BY AUTOMATED COUNT: 12.5 % (ref 11.5–14.5)
EST. GFR  (AFRICAN AMERICAN): >60 ML/MIN/1.73 M^2
EST. GFR  (NON AFRICAN AMERICAN): >60 ML/MIN/1.73 M^2
ESTIMATED AVG GLUCOSE: 189 MG/DL (ref 68–131)
GLUCOSE SERPL-MCNC: 331 MG/DL (ref 70–110)
HBA1C MFR BLD: 8.2 % (ref 4–5.6)
HCT VFR BLD AUTO: 44.2 % (ref 40–54)
HGB BLD-MCNC: 14.6 G/DL (ref 14–18)
IMM GRANULOCYTES # BLD AUTO: 0.07 K/UL (ref 0–0.04)
IMM GRANULOCYTES NFR BLD AUTO: 0.6 % (ref 0–0.5)
LYMPHOCYTES # BLD AUTO: 2.9 K/UL (ref 1–4.8)
LYMPHOCYTES NFR BLD: 24.9 % (ref 18–48)
MCH RBC QN AUTO: 28.9 PG (ref 27–31)
MCHC RBC AUTO-ENTMCNC: 33 G/DL (ref 32–36)
MCV RBC AUTO: 88 FL (ref 82–98)
MONOCYTES # BLD AUTO: 0.5 K/UL (ref 0.3–1)
MONOCYTES NFR BLD: 4.1 % (ref 4–15)
NEUTROPHILS # BLD AUTO: 8 K/UL (ref 1.8–7.7)
NEUTROPHILS NFR BLD: 67.6 % (ref 38–73)
NRBC BLD-RTO: 0 /100 WBC
PLATELET # BLD AUTO: 194 K/UL (ref 150–450)
PMV BLD AUTO: 11.6 FL (ref 9.2–12.9)
POCT GLUCOSE: 106 MG/DL (ref 70–110)
POCT GLUCOSE: 192 MG/DL (ref 70–110)
POTASSIUM SERPL-SCNC: 4.2 MMOL/L (ref 3.5–5.1)
PROT SERPL-MCNC: 7.3 G/DL (ref 6–8.4)
RBC # BLD AUTO: 5.05 M/UL (ref 4.6–6.2)
SARS-COV-2 RDRP RESP QL NAA+PROBE: NEGATIVE
SODIUM SERPL-SCNC: 134 MMOL/L (ref 136–145)
TROPONIN I SERPL DL<=0.01 NG/ML-MCNC: 0.01 NG/ML (ref 0–0.03)
TROPONIN I SERPL DL<=0.01 NG/ML-MCNC: <0.006 NG/ML (ref 0–0.03)
WBC # BLD AUTO: 11.8 K/UL (ref 3.9–12.7)

## 2022-01-19 PROCEDURE — 93005 ELECTROCARDIOGRAM TRACING: CPT

## 2022-01-19 PROCEDURE — 25000003 PHARM REV CODE 250: Performed by: STUDENT IN AN ORGANIZED HEALTH CARE EDUCATION/TRAINING PROGRAM

## 2022-01-19 PROCEDURE — G0378 HOSPITAL OBSERVATION PER HR: HCPCS

## 2022-01-19 PROCEDURE — 99220 PR INITIAL OBSERVATION CARE,LEVL III: CPT | Mod: 25,,, | Performed by: INTERNAL MEDICINE

## 2022-01-19 PROCEDURE — 25000003 PHARM REV CODE 250: Performed by: NURSE PRACTITIONER

## 2022-01-19 PROCEDURE — 93010 EKG 12-LEAD: ICD-10-PCS | Mod: ,,, | Performed by: INTERNAL MEDICINE

## 2022-01-19 PROCEDURE — 63600175 PHARM REV CODE 636 W HCPCS: Performed by: STUDENT IN AN ORGANIZED HEALTH CARE EDUCATION/TRAINING PROGRAM

## 2022-01-19 PROCEDURE — 93010 ELECTROCARDIOGRAM REPORT: CPT | Mod: ,,, | Performed by: INTERNAL MEDICINE

## 2022-01-19 PROCEDURE — 99285 EMERGENCY DEPT VISIT HI MDM: CPT | Mod: 25

## 2022-01-19 PROCEDURE — 83880 ASSAY OF NATRIURETIC PEPTIDE: CPT | Performed by: EMERGENCY MEDICINE

## 2022-01-19 PROCEDURE — 85025 COMPLETE CBC W/AUTO DIFF WBC: CPT | Performed by: EMERGENCY MEDICINE

## 2022-01-19 PROCEDURE — 83036 HEMOGLOBIN GLYCOSYLATED A1C: CPT | Performed by: STUDENT IN AN ORGANIZED HEALTH CARE EDUCATION/TRAINING PROGRAM

## 2022-01-19 PROCEDURE — 80053 COMPREHEN METABOLIC PANEL: CPT | Performed by: EMERGENCY MEDICINE

## 2022-01-19 PROCEDURE — 82550 ASSAY OF CK (CPK): CPT | Performed by: EMERGENCY MEDICINE

## 2022-01-19 PROCEDURE — U0002 COVID-19 LAB TEST NON-CDC: HCPCS | Performed by: EMERGENCY MEDICINE

## 2022-01-19 PROCEDURE — 99220 PR INITIAL OBSERVATION CARE,LEVL III: ICD-10-PCS | Mod: 25,,, | Performed by: INTERNAL MEDICINE

## 2022-01-19 PROCEDURE — 84484 ASSAY OF TROPONIN QUANT: CPT | Performed by: EMERGENCY MEDICINE

## 2022-01-19 PROCEDURE — 36000 PLACE NEEDLE IN VEIN: CPT

## 2022-01-19 PROCEDURE — 96372 THER/PROPH/DIAG INJ SC/IM: CPT

## 2022-01-19 PROCEDURE — C9399 UNCLASSIFIED DRUGS OR BIOLOG: HCPCS | Performed by: STUDENT IN AN ORGANIZED HEALTH CARE EDUCATION/TRAINING PROGRAM

## 2022-01-19 PROCEDURE — 25000003 PHARM REV CODE 250: Performed by: EMERGENCY MEDICINE

## 2022-01-19 RX ORDER — SIMETHICONE 80 MG
1 TABLET,CHEWABLE ORAL 4 TIMES DAILY PRN
Status: DISCONTINUED | OUTPATIENT
Start: 2022-01-19 | End: 2022-01-20 | Stop reason: HOSPADM

## 2022-01-19 RX ORDER — SODIUM CHLORIDE 0.9 % (FLUSH) 0.9 %
10 SYRINGE (ML) INJECTION EVERY 8 HOURS PRN
Status: DISCONTINUED | OUTPATIENT
Start: 2022-01-19 | End: 2022-01-20 | Stop reason: HOSPADM

## 2022-01-19 RX ORDER — LEVETIRACETAM 750 MG/1
TABLET ORAL
Status: ON HOLD | COMMUNITY
End: 2022-01-20 | Stop reason: HOSPADM

## 2022-01-19 RX ORDER — AZILSARTAN KAMEDOXOMIL 40 MG/1
20 TABLET ORAL DAILY
COMMUNITY

## 2022-01-19 RX ORDER — ACETAMINOPHEN 325 MG/1
650 TABLET ORAL EVERY 4 HOURS PRN
Status: DISCONTINUED | OUTPATIENT
Start: 2022-01-19 | End: 2022-01-20 | Stop reason: HOSPADM

## 2022-01-19 RX ORDER — SIMETHICONE 125 MG
125 TABLET,CHEWABLE ORAL EVERY 6 HOURS PRN
COMMUNITY

## 2022-01-19 RX ORDER — PANTOPRAZOLE SODIUM 40 MG/1
40 TABLET, DELAYED RELEASE ORAL DAILY
Status: DISCONTINUED | OUTPATIENT
Start: 2022-01-20 | End: 2022-01-20 | Stop reason: HOSPADM

## 2022-01-19 RX ORDER — CYCLOBENZAPRINE HCL 10 MG
10 TABLET ORAL 3 TIMES DAILY PRN
Status: DISCONTINUED | OUTPATIENT
Start: 2022-01-19 | End: 2022-01-20 | Stop reason: HOSPADM

## 2022-01-19 RX ORDER — OMEGA-3-ACID ETHYL ESTERS 1 G/1
2 CAPSULE, LIQUID FILLED ORAL 2 TIMES DAILY
COMMUNITY
End: 2022-01-19

## 2022-01-19 RX ORDER — PROCHLORPERAZINE EDISYLATE 5 MG/ML
5 INJECTION INTRAMUSCULAR; INTRAVENOUS EVERY 6 HOURS PRN
Status: DISCONTINUED | OUTPATIENT
Start: 2022-01-19 | End: 2022-01-20 | Stop reason: HOSPADM

## 2022-01-19 RX ORDER — LOSARTAN POTASSIUM 25 MG/1
50 TABLET ORAL DAILY
Status: DISCONTINUED | OUTPATIENT
Start: 2022-01-20 | End: 2022-01-20 | Stop reason: HOSPADM

## 2022-01-19 RX ORDER — ONDANSETRON 8 MG/1
8 TABLET, ORALLY DISINTEGRATING ORAL EVERY 8 HOURS PRN
Status: DISCONTINUED | OUTPATIENT
Start: 2022-01-19 | End: 2022-01-20 | Stop reason: HOSPADM

## 2022-01-19 RX ORDER — POLYETHYLENE GLYCOL 3350 17 G/17G
17 POWDER, FOR SOLUTION ORAL DAILY
Status: DISCONTINUED | OUTPATIENT
Start: 2022-01-19 | End: 2022-01-20 | Stop reason: HOSPADM

## 2022-01-19 RX ORDER — ASPIRIN 81 MG/1
81 TABLET ORAL DAILY
Status: DISCONTINUED | OUTPATIENT
Start: 2022-01-20 | End: 2022-01-20 | Stop reason: HOSPADM

## 2022-01-19 RX ORDER — UBROGEPANT 50 MG/1
TABLET ORAL
COMMUNITY

## 2022-01-19 RX ORDER — IBUPROFEN 200 MG
24 TABLET ORAL
Status: DISCONTINUED | OUTPATIENT
Start: 2022-01-19 | End: 2022-01-20 | Stop reason: HOSPADM

## 2022-01-19 RX ORDER — ENOXAPARIN SODIUM 100 MG/ML
40 INJECTION SUBCUTANEOUS EVERY 24 HOURS
Status: DISCONTINUED | OUTPATIENT
Start: 2022-01-19 | End: 2022-01-20 | Stop reason: HOSPADM

## 2022-01-19 RX ORDER — LEVETIRACETAM 500 MG/1
500 TABLET ORAL NIGHTLY
Status: DISCONTINUED | OUTPATIENT
Start: 2022-01-19 | End: 2022-01-20 | Stop reason: HOSPADM

## 2022-01-19 RX ORDER — LORAZEPAM 0.5 MG/1
TABLET ORAL
COMMUNITY

## 2022-01-19 RX ORDER — OMEGA-3/DHA/EPA/FISH OIL 300-1000MG
CAPSULE,DELAYED RELEASE (ENTERIC COATED) ORAL DAILY
Status: DISCONTINUED | OUTPATIENT
Start: 2022-01-20 | End: 2022-01-20 | Stop reason: HOSPADM

## 2022-01-19 RX ORDER — NAPROXEN SODIUM 220 MG/1
162 TABLET, FILM COATED ORAL DAILY
Status: DISCONTINUED | OUTPATIENT
Start: 2022-01-19 | End: 2022-01-19

## 2022-01-19 RX ORDER — LAMOTRIGINE 200 MG/1
200 TABLET ORAL 2 TIMES DAILY
COMMUNITY
Start: 2022-01-10

## 2022-01-19 RX ORDER — MULTIVIT WITH MINERALS/HERBS
1 TABLET ORAL DAILY
COMMUNITY

## 2022-01-19 RX ORDER — ERGOCALCIFEROL 1.25 MG/1
50000 CAPSULE ORAL
COMMUNITY
Start: 2021-12-31

## 2022-01-19 RX ORDER — ZINC GLUCONATE 50 MG
25 TABLET ORAL DAILY
COMMUNITY

## 2022-01-19 RX ORDER — INSULIN ASPART 100 [IU]/ML
0-5 INJECTION, SOLUTION INTRAVENOUS; SUBCUTANEOUS
Status: DISCONTINUED | OUTPATIENT
Start: 2022-01-19 | End: 2022-01-20 | Stop reason: HOSPADM

## 2022-01-19 RX ORDER — TALC
6 POWDER (GRAM) TOPICAL NIGHTLY PRN
Status: DISCONTINUED | OUTPATIENT
Start: 2022-01-19 | End: 2022-01-20 | Stop reason: HOSPADM

## 2022-01-19 RX ORDER — MAG HYDROX/ALUMINUM HYD/SIMETH 200-200-20
30 SUSPENSION, ORAL (FINAL DOSE FORM) ORAL 4 TIMES DAILY PRN
Status: DISCONTINUED | OUTPATIENT
Start: 2022-01-19 | End: 2022-01-20 | Stop reason: HOSPADM

## 2022-01-19 RX ORDER — GLUCAGON 1 MG
1 KIT INJECTION
Status: DISCONTINUED | OUTPATIENT
Start: 2022-01-19 | End: 2022-01-20 | Stop reason: HOSPADM

## 2022-01-19 RX ORDER — ATORVASTATIN CALCIUM 40 MG/1
40 TABLET, FILM COATED ORAL DAILY
Status: DISCONTINUED | OUTPATIENT
Start: 2022-01-20 | End: 2022-01-20 | Stop reason: HOSPADM

## 2022-01-19 RX ORDER — NALOXONE HCL 0.4 MG/ML
0.02 VIAL (ML) INJECTION
Status: DISCONTINUED | OUTPATIENT
Start: 2022-01-19 | End: 2022-01-20 | Stop reason: HOSPADM

## 2022-01-19 RX ORDER — IBUPROFEN 200 MG
16 TABLET ORAL
Status: DISCONTINUED | OUTPATIENT
Start: 2022-01-19 | End: 2022-01-20 | Stop reason: HOSPADM

## 2022-01-19 RX ORDER — LAMOTRIGINE 100 MG/1
200 TABLET ORAL 2 TIMES DAILY
Status: DISCONTINUED | OUTPATIENT
Start: 2022-01-19 | End: 2022-01-20 | Stop reason: HOSPADM

## 2022-01-19 RX ADMIN — ENOXAPARIN SODIUM 40 MG: 100 INJECTION SUBCUTANEOUS at 05:01

## 2022-01-19 RX ADMIN — ASPIRIN 81 MG CHEWABLE TABLET 162 MG: 81 TABLET CHEWABLE at 10:01

## 2022-01-19 RX ADMIN — INSULIN DETEMIR 8 UNITS: 100 INJECTION, SOLUTION SUBCUTANEOUS at 10:01

## 2022-01-19 RX ADMIN — LAMOTRIGINE 200 MG: 100 TABLET ORAL at 09:01

## 2022-01-19 RX ADMIN — POLYETHYLENE GLYCOL 3350 17 G: 17 POWDER, FOR SOLUTION ORAL at 03:01

## 2022-01-19 RX ADMIN — CYCLOBENZAPRINE 10 MG: 10 TABLET, FILM COATED ORAL at 10:01

## 2022-01-19 NOTE — ASSESSMENT & PLAN NOTE
-lamotrigine 200 mg bid  -per pt he was advised by his neurologist to stop taking Keppra on 1/20

## 2022-01-19 NOTE — ASSESSMENT & PLAN NOTE
The patient is asked to make an attempt to improve diet and exercise patterns to aid in medical management of this problem. We specifically discussed cutting calorie intake by 500-1000 calories per day for a goal of a 1-2 pound weight loss per week and recommendations for a mostly plant based diet with limited red meats/refined grains/processed foods/added sugars.

## 2022-01-19 NOTE — HPI
"Kristian Singh is a 45 y/o male with a PMHx of obesity, hypertension, Migraines, seizures, HLD, Diabetes Mellitus Type 2, and TIA (8/2021) who presented to the ED on 1/19 with a chief complaint of left shoulder pain and chest pain which began on 1/18.  He states that yesterday he felt like it was his rotator cuff that was acting up which was unusual because he has not had any increased physical activity. On the evening of 1/8 he experienced worsening of his chronic GERD with severe indigestion that required multiple doses of Tums and Maalox with mild relief.  He also reports being diffusely diaphoretic during these times. On the morning of 1/19 he developed associated chest pain with his left shoulder pain which radiates across his left chest to his substernal area.  He also reports pain radiating down his left arm. After arrival to ED he was no longer diaphoretic. Pt denies any associated nausea or shortness of breath. Family hx of premature coronary artery disease on both mother and fathers side. Pt states he has been compliant with daily aspirin. TTE from 8/2021 revealed "EF 60% and indeterminate left ventricular diastolic function." NKDA. Pt states he stopped smoking in August 2021.    ED Course:  EKG impression NSR, no STEMI. Troponin <0.006; BNP <10.  Negative for COVID-19. Chest xray impression no acute cardiopulmonary finding. Aspirin 162 mg administered.   "

## 2022-01-19 NOTE — HPI
"47 y/o male with a PMHx of obesity, hypertension, Migraines, seizures, HLD, Diabetes Mellitus Type 2, and TIA (8/2021) who presented to the ED on 1/19 with a chief complaint of left shoulder pain and chest pain which began on 1/18.  He states that yesterday he felt like it was his rotator cuff that was acting up which was unusual because he has not had any increased physical activity. On the evening of 1/8 he experienced worsening of his chronic GERD with severe indigestion that required multiple doses of Tums and Maalox with mild relief.  He also reports being diffusely diaphoretic during these times. On the morning of 1/19 he developed associated chest pain with his left shoulder pain which radiates across his left chest to his substernal area.  He also reports pain radiating down his left arm. After arrival to ED he was no longer diaphoretic. Pt denies any associated nausea or shortness of breath. Family hx of premature coronary artery disease on both mother and fathers side. Pt states he has been compliant with daily aspirin. TTE from 8/2021 revealed "EF 60% and indeterminate left ventricular diastolic function." NKDA. Pt states he stopped smoking in August 2021.  ED Course:  EKG impression NSR, no STEMI. Troponin <0.006; BNP <10.  Negative for COVID-19. Chest xray impression no acute cardiopulmonary finding. Aspirin 162 mg administered.     Cardiology consulted for CP.    Patient pleasant 46-year-old man with multiple risk factors presenting with complaints of left arm and shoulder discomfort radiating to his chest.  He has a history of hypertension, diabetes, dyslipidemia, and TIA as well as a family history of premature CAD.  Cardiac enzymes have been negative EKG is normal.  ACS seems unlikely but cannot be totally ruled out.  "

## 2022-01-19 NOTE — CONSULTS
"[facility]  Cardiology  Consult Note    Patient Name: Kristian Singh  MRN: [id number]  Admission Date: 1/19/2022  Hospital Length of Stay: 0 days  Code Status: Full Code   Attending Provider: Axel San MD   Consulting Provider: Latrell Cody MD  Primary Care Physician: Primary Doctor No  Principal Problem:Chest pain    Patient information was obtained from patient, spouse/SO and ER records.     Inpatient consult to Cardiology  Consult performed by: Latrell Cody MD  Consult ordered by: Axel San MD  Reason for consult: CP        Subjective:     Chief Complaint:  CP     HPI:   47 y/o male with a PMHx of obesity, hypertension, Migraines, seizures, HLD, Diabetes Mellitus Type 2, and TIA (8/2021) who presented to the ED on 1/19 with a chief complaint of left shoulder pain and chest pain which began on 1/18.  He states that yesterday he felt like it was his rotator cuff that was acting up which was unusual because he has not had any increased physical activity. On the evening of 1/8 he experienced worsening of his chronic GERD with severe indigestion that required multiple doses of Tums and Maalox with mild relief.  He also reports being diffusely diaphoretic during these times. On the morning of 1/19 he developed associated chest pain with his left shoulder pain which radiates across his left chest to his substernal area.  He also reports pain radiating down his left arm. After arrival to ED he was no longer diaphoretic. Pt denies any associated nausea or shortness of breath. Family hx of premature coronary artery disease on both mother and fathers side. Pt states he has been compliant with daily aspirin. TTE from 8/2021 revealed "EF 60% and indeterminate left ventricular diastolic function." NKDA. Pt states he stopped smoking in August 2021.  ED Course:  EKG impression NSR, no STEMI. Troponin <0.006; BNP <10.  Negative for COVID-19. Chest xray impression no acute " cardiopulmonary finding. Aspirin 162 mg administered.     Cardiology consulted for CP.    Patient pleasant 46-year-old man with multiple risk factors presenting with complaints of left arm and shoulder discomfort radiating to his chest.  He has a history of hypertension, diabetes, dyslipidemia, and TIA as well as a family history of premature CAD.  Cardiac enzymes have been negative EKG is normal.  ACS seems unlikely but cannot be totally ruled out.      Past Medical History:   Diagnosis Date    Cigarette smoker     GERD (gastroesophageal reflux disease)     Hypertension     Loud snoring     Obese body habitus     Stroke 08/01/2021    TIA, RESIDUAL DEFICITS       Past Surgical History:   Procedure Laterality Date    DENIES SURGICAL  HISTORY         Review of patient's allergies indicates:  No Known Allergies    No current facility-administered medications on file prior to encounter.     Current Outpatient Medications on File Prior to Encounter   Medication Sig    ascorbic acid, vitamin C, (VITAMIN C) 100 MG tablet Take 500 mg by mouth once daily.    aspirin (ECOTRIN) 81 MG EC tablet Take 1 tablet (81 mg total) by mouth once daily.    aspirin-acetaminophen-caffeine 250-250-65 mg (EXCEDRIN MIGRAINE) 250-250-65 mg per tablet Take 1 tablet by mouth every 6 (six) hours as needed for Pain.    atorvastatin (LIPITOR) 20 MG tablet Take 1 tablet (20 mg total) by mouth once daily. (Patient taking differently: Take 40 mg by mouth once daily.)    azilsartan medoxomiL (EDARBI) 40 mg Tab Take 20 mg by mouth once daily.    b complex vitamins tablet Take 1 tablet by mouth once daily.    calcium carbonate (TUMS ORAL) Take by mouth.    Lactobacillus rhamnosus GG (CULTURELLE) 10 billion cell capsule Take 1 capsule by mouth once daily.    lamoTRIgine (LAMICTAL) 200 MG tablet Take 200 mg by mouth 2 (two) times daily.    levETIRAcetam (KEPPRA) 750 MG Tab levetiracetam 750 mg tablet   ONE BID    metFORMIN (GLUCOPHAGE) 500  MG tablet Take 1 tablet (500 mg total) by mouth 2 (two) times daily with meals.    omega-3/dha/epa/fish oil (OMEGA-3 FISH OIL ORAL) Take 500 mg by mouth once daily.    pantoprazole (PROTONIX) 40 MG tablet Take 40 mg by mouth once daily.    simethicone (MYLICON) 125 MG chewable tablet Take 125 mg by mouth every 6 (six) hours as needed for Flatulence.    ubrogepant (UBROGEPANT) 50 mg tablet Ubrelvy 50 mg tablet   Take 1 tablet as needed by oral route as needed for 30 days.    zinc gluconate 50 mg tablet Take 25 mg by mouth once daily. Mon, Wed, Fri    clopidogreL (PLAVIX) 75 mg tablet Take 1 tablet (75 mg total) by mouth once daily. for 20 days    ergocalciferol (ERGOCALCIFEROL) 50,000 unit Cap Take 50,000 Units by mouth every 7 days. Mondays    LORazepam (ATIVAN) 0.5 MG tablet lorazepam 0.5 mg tablet   TAKE 1 TABLET BY MOUTH TWICE A DAY AS NEEDED    [DISCONTINUED] lisinopriL (PRINIVIL,ZESTRIL) 20 MG tablet Take 1 tablet (20 mg total) by mouth once daily.    [DISCONTINUED] omega-3 acid ethyl esters (LOVAZA) 1 gram capsule Take 2 g by mouth 2 (two) times daily.     Family History     Problem Relation (Age of Onset)    Cancer Mother, Father, Brother        Tobacco Use    Smoking status: Current Every Day Smoker     Packs/day: 0.50     Types: Cigarettes    Smokeless tobacco: Never Used   Substance and Sexual Activity    Alcohol use: Yes     Alcohol/week: 0.8 standard drinks     Types: 1 Standard drinks or equivalent per week     Comment: occasionally    Drug use: No    Sexual activity: Yes     Review of Systems   Constitutional: Negative for chills, diaphoresis, fever and malaise/fatigue.   HENT: Negative for nosebleeds.    Eyes: Negative for blurred vision and double vision.   Cardiovascular: Positive for chest pain. Negative for claudication, cyanosis, dyspnea on exertion, leg swelling, orthopnea, palpitations, paroxysmal nocturnal dyspnea and syncope.   Respiratory: Negative for cough, shortness of  breath and wheezing.    Skin: Negative for dry skin and poor wound healing.   Musculoskeletal: Negative for back pain, joint swelling and myalgias.   Gastrointestinal: Negative for abdominal pain, nausea and vomiting.   Genitourinary: Negative for hematuria.   Neurological: Negative for dizziness, headaches, numbness, seizures and weakness.   Psychiatric/Behavioral: Negative for altered mental status and depression.     Objective:     Vital Signs (Most Recent):  Temp: 98.2 °F (36.8 °C) (01/19/22 0755)  Pulse: 93 (01/19/22 1302)  Resp: (!) 22 (01/19/22 1302)  BP: 125/72 (01/19/22 1302)  SpO2: 95 % (01/19/22 1302) Vital Signs (24h Range):  Temp:  [98.2 °F (36.8 °C)] 98.2 °F (36.8 °C)  Pulse:  [85-93] 93  Resp:  [13-22] 22  SpO2:  [95 %-97 %] 95 %  BP: (125-169)/(72-80) 125/72     Weight: 131.5 kg (290 lb)  Body mass index is 40.45 kg/m².    SpO2: 95 %  O2 Device (Oxygen Therapy): room air    No intake or output data in the 24 hours ending 01/19/22 1544    Lines/Drains/Airways     Peripheral Intravenous Line                 Peripheral IV - Single Lumen 01/19/22 1001 20 G Right Antecubital <1 day                Physical Exam  Constitutional:       General: He is not in acute distress.     Appearance: He is well-developed and well-nourished. He is obese. He is not ill-appearing, toxic-appearing or diaphoretic.   HENT:      Head: Normocephalic and atraumatic.   Eyes:      General: No scleral icterus.     Extraocular Movements: Extraocular movements intact and EOM normal.      Conjunctiva/sclera: Conjunctivae normal.      Pupils: Pupils are equal, round, and reactive to light.   Neck:      Thyroid: No thyromegaly.      Vascular: No JVD.      Trachea: No tracheal deviation.   Cardiovascular:      Rate and Rhythm: Normal rate and regular rhythm.      Heart sounds: S1 normal and S2 normal. No murmur heard.  No friction rub. No gallop.    Pulmonary:      Effort: Pulmonary effort is normal. No respiratory distress.      Breath  sounds: Normal breath sounds. No stridor. No wheezing, rhonchi or rales.   Chest:      Chest wall: No tenderness.   Abdominal:      General: There is no distension.      Palpations: Abdomen is soft.   Musculoskeletal:         General: No swelling, tenderness or edema. Normal range of motion.      Cervical back: Normal range of motion and neck supple. No rigidity.      Right lower leg: No edema.      Left lower leg: No edema.   Skin:     General: Skin is warm and dry.      Coloration: Skin is not jaundiced.      Findings: No rash.   Neurological:      General: No focal deficit present.      Mental Status: He is alert and oriented to person, place, and time.      Cranial Nerves: No cranial nerve deficit.      Deep Tendon Reflexes: Strength normal.   Psychiatric:         Mood and Affect: Mood and affect and mood normal.         Behavior: Behavior normal.         Current Medications:   [START ON 1/20/2022] aspirin  81 mg Oral Daily    [START ON 1/20/2022] atorvastatin  40 mg Oral Daily    enoxaparin  40 mg Subcutaneous Daily    lamoTRIgine  200 mg Oral BID    levETIRAcetam  500 mg Oral Nightly    [START ON 1/20/2022] losartan  50 mg Oral Daily    [START ON 1/20/2022] omega 3-dha-epa-fish oil   Oral Daily    [START ON 1/20/2022] pantoprazole  40 mg Oral Daily    polyethylene glycol  17 g Oral Daily       acetaminophen, aluminum-magnesium hydroxide-simethicone, dextrose 50%, dextrose 50%, glucagon (human recombinant), glucose, glucose, insulin aspart U-100, melatonin, naloxone, ondansetron, prochlorperazine, simethicone, sodium chloride 0.9%    Laboratory (all labs reviewed):  CBC:  Recent Labs   Lab 08/01/21 1906 08/01/21 1910 08/02/21  0433 01/19/22  0902   WBC 16.12 H  --  13.88 H 11.80   Hemoglobin 16.2  --  14.9 14.6   POC Hematocrit  --  47  --   --    Hematocrit 46.2  --  45.6 44.2   Platelets 219  --  186 194       CHEMISTRIES:  Recent Labs   Lab 08/01/21 1906 08/02/21  0433 01/19/22  0902   Glucose  221 H 259 H 331 H   Sodium 140 139 134 L   Potassium 4.0 4.3 4.2   BUN 10 11 12   Creatinine 1.0 1.0 0.8   eGFR if African American >60 >60 >60   eGFR if non African American >60 >60 >60   Calcium 9.7 9.6 10.0   Magnesium  --  1.9  --        CARDIAC BIOMARKERS:  Recent Labs   Lab 08/01/21  1906 01/19/22  0902 01/19/22  1202   CPK  --  104  --    Troponin I <0.006 <0.006 0.011       COAGS:  Recent Labs   Lab 08/01/21  1906 08/02/21  0433   INR 1.0 1.0       LIPIDS/LFTS:  Recent Labs   Lab 08/01/21 1906 08/02/21  0433 01/19/22  0902   Cholesterol 218 H  --   --    Triglycerides 281 H  --   --    HDL 33 L  --   --    LDL Cholesterol 128.8  --   --    Non-HDL Cholesterol 185  --   --    AST 30 22 35   ALT 40 34 49 H       BNP:  Recent Labs   Lab 01/19/22  0902   BNP <10       TSH:  Recent Labs   Lab 08/01/21  1906   TSH 1.885       Free T4:        Diagnostic Results:  ECG (personally reviewed and interpreted tracing(s)):  1/19/22 -0751 SR 92, LAD/PRWP, similar to 8/1/21    Chest X-Ray (personally reviewed and interpreted image(s)): 1/19/22 NAD    Therese MPI: ordered    Echo: 8/2/21 (repeat ordered)  · The left ventricle is normal in size with concentric remodeling and normal systolic function.  · The estimated ejection fraction is 60%.  · Indeterminate left ventricular diastolic function.  · Normal right ventricular size with normal right ventricular systolic function.  · Normal central venous pressure (3 mmHg).  · The estimated PA systolic pressure is 10 mmHg.  · There is no pulmonary hypertension.        Assessment and Plan:     * Chest pain  Concerning sxs in pt with mult RF  EKG/trop neg  Check echo and Therese MPI in am (pt unable to exercise)    Primary hypertension  Cont med rx    Type 2 diabetes mellitus  Mgmt per IM    Other hyperlipidemia  Cont statin    History of TIA (transient ischemic attack)  8/2021        VTE Risk Mitigation (From admission, onward)         Ordered     enoxaparin injection 40 mg  Daily          01/19/22 1514     IP VTE HIGH RISK PATIENT  Once         01/19/22 1514     Place sequential compression device  Until discontinued         01/19/22 1514                Thank you for your consult. I will follow-up with patient. Please contact us if you have any additional questions.    Latrell Cody MD  Cardiology   [facility]

## 2022-01-19 NOTE — H&P
"[facility] Medicine  History & Physical    Patient Name: Kristian Singh  MRN: [id number]  Patient Class: OP- Observation  Admission Date: 1/19/2022  Attending Physician: Axel San MD   Primary Care Provider: Primary Doctor No         Patient information was obtained from patient, past medical records and ER records.     Subjective:     Principal Problem:Chest pain    Chief Complaint:   Chief Complaint   Patient presents with    Chest Pain     Pt reports left shoulder pain that radiated down his left arm that started yesterday. Reports today the pain radiated to his chest today. Pt reports he had a TIA in August 2021. Reports he took TUMS and GasX today with some relief. Reports he took 81 mg ASA.         HPI: Kristian Singh is a 47 y/o male with a PMHx of obesity, hypertension, Migraines, seizures, HLD, Diabetes Mellitus Type 2, and TIA (8/2021) who presented to the ED on 1/19 with a chief complaint of left shoulder pain and chest pain which began on 1/18.  He states that yesterday he felt like it was his rotator cuff that was acting up which was unusual because he has not had any increased physical activity. On the evening of 1/8 he experienced worsening of his chronic GERD with severe indigestion that required multiple doses of Tums and Maalox with mild relief.  He also reports being diffusely diaphoretic during these times. On the morning of 1/19 he developed associated chest pain with his left shoulder pain which radiates across his left chest to his substernal area.  He also reports pain radiating down his left arm. After arrival to ED he was no longer diaphoretic. Pt denies any associated nausea or shortness of breath. Family hx of premature coronary artery disease on both mother and fathers side. Pt states he has been compliant with daily aspirin. TTE from 8/2021 revealed "EF 60% and indeterminate left ventricular diastolic function." NKDA. Pt states he stopped smoking in " August 2021.    ED Course:  EKG impression NSR, no STEMI. Troponin <0.006; BNP <10.  Negative for COVID-19. Chest xray impression no acute cardiopulmonary finding. Aspirin 162 mg administered.       Past Medical History:   Diagnosis Date    Cigarette smoker     GERD (gastroesophageal reflux disease)     Hypertension     Loud snoring     Obese body habitus     Stroke 08/01/2021    TIA, RESIDUAL DEFICITS       Past Surgical History:   Procedure Laterality Date    DENIES SURGICAL  HISTORY         Review of patient's allergies indicates:  No Known Allergies    No current facility-administered medications on file prior to encounter.     Current Outpatient Medications on File Prior to Encounter   Medication Sig    ascorbic acid, vitamin C, (VITAMIN C) 100 MG tablet Take 500 mg by mouth once daily.    aspirin (ECOTRIN) 81 MG EC tablet Take 1 tablet (81 mg total) by mouth once daily.    aspirin-acetaminophen-caffeine 250-250-65 mg (EXCEDRIN MIGRAINE) 250-250-65 mg per tablet Take 1 tablet by mouth every 6 (six) hours as needed for Pain.    atorvastatin (LIPITOR) 20 MG tablet Take 1 tablet (20 mg total) by mouth once daily. (Patient taking differently: Take 40 mg by mouth once daily.)    azilsartan medoxomiL (EDARBI) 40 mg Tab Take 20 mg by mouth once daily.    b complex vitamins tablet Take 1 tablet by mouth once daily.    calcium carbonate (TUMS ORAL) Take by mouth.    Lactobacillus rhamnosus GG (CULTURELLE) 10 billion cell capsule Take 1 capsule by mouth once daily.    lamoTRIgine (LAMICTAL) 200 MG tablet Take 200 mg by mouth 2 (two) times daily.    levETIRAcetam (KEPPRA) 750 MG Tab levetiracetam 750 mg tablet   ONE BID    metFORMIN (GLUCOPHAGE) 500 MG tablet Take 1 tablet (500 mg total) by mouth 2 (two) times daily with meals.    omega-3/dha/epa/fish oil (OMEGA-3 FISH OIL ORAL) Take 500 mg by mouth once daily.    pantoprazole (PROTONIX) 40 MG tablet Take 40 mg by mouth once daily.    simethicone  (MYLICON) 125 MG chewable tablet Take 125 mg by mouth every 6 (six) hours as needed for Flatulence.    ubrogepant (UBROGEPANT) 50 mg tablet Ubrelvy 50 mg tablet   Take 1 tablet as needed by oral route as needed for 30 days.    zinc gluconate 50 mg tablet Take 25 mg by mouth once daily. Mon, Wed, Fri    clopidogreL (PLAVIX) 75 mg tablet Take 1 tablet (75 mg total) by mouth once daily. for 20 days    ergocalciferol (ERGOCALCIFEROL) 50,000 unit Cap Take 50,000 Units by mouth every 7 days. Mondays    LORazepam (ATIVAN) 0.5 MG tablet lorazepam 0.5 mg tablet   TAKE 1 TABLET BY MOUTH TWICE A DAY AS NEEDED    [DISCONTINUED] lisinopriL (PRINIVIL,ZESTRIL) 20 MG tablet Take 1 tablet (20 mg total) by mouth once daily.    [DISCONTINUED] omega-3 acid ethyl esters (LOVAZA) 1 gram capsule Take 2 g by mouth 2 (two) times daily.     Family History     Problem Relation (Age of Onset)    Cancer Mother, Father, Brother        Tobacco Use    Smoking status: Current Every Day Smoker     Packs/day: 0.50     Types: Cigarettes    Smokeless tobacco: Never Used   Substance and Sexual Activity    Alcohol use: Yes     Alcohol/week: 0.8 standard drinks     Types: 1 Standard drinks or equivalent per week     Comment: occasionally    Drug use: No    Sexual activity: Yes     Review of Systems   Constitutional: Positive for diaphoresis (resolved) and unexpected weight change. Negative for chills and fever.   HENT: Negative for sore throat and trouble swallowing.    Eyes: Negative for visual disturbance.   Respiratory: Positive for chest tightness. Negative for cough, shortness of breath and wheezing.    Cardiovascular: Positive for chest pain (rated 2/10). Negative for palpitations and leg swelling.   Gastrointestinal: Negative for abdominal pain, constipation, diarrhea, nausea and vomiting.   Endocrine: Negative for polydipsia, polyphagia and polyuria.   Genitourinary: Negative for decreased urine volume, dysuria and hematuria.    Musculoskeletal: Negative for arthralgias and myalgias.   Skin: Negative for color change and rash.   Neurological: Negative for dizziness, weakness, light-headedness and headaches.   Psychiatric/Behavioral: Negative for confusion, decreased concentration, sleep disturbance and suicidal ideas.     Objective:     Vital Signs (Most Recent):  Temp: 98.2 °F (36.8 °C) (01/19/22 0755)  Pulse: 93 (01/19/22 1302)  Resp: (!) 22 (01/19/22 1302)  BP: 125/72 (01/19/22 1302)  SpO2: 95 % (01/19/22 1302) Vital Signs (24h Range):  Temp:  [98.2 °F (36.8 °C)] 98.2 °F (36.8 °C)  Pulse:  [85-93] 93  Resp:  [13-22] 22  SpO2:  [95 %-97 %] 95 %  BP: (125-169)/(72-80) 125/72     Weight: 131.5 kg (290 lb)  Body mass index is 40.45 kg/m².    Physical Exam  Constitutional:       General: He is not in acute distress.     Appearance: He is obese. He is not ill-appearing or diaphoretic.   HENT:      Head: Normocephalic and atraumatic.      Mouth/Throat:      Mouth: Mucous membranes are moist.   Eyes:      General: No scleral icterus.        Right eye: No discharge.         Left eye: No discharge.      Pupils: Pupils are equal, round, and reactive to light.   Cardiovascular:      Rate and Rhythm: Normal rate and regular rhythm.      Heart sounds: No murmur heard.      Pulmonary:      Effort: No respiratory distress.      Breath sounds: No wheezing.   Abdominal:      Tenderness: There is no abdominal tenderness.   Musculoskeletal:         General: No swelling or signs of injury.      Cervical back: Normal range of motion. No tenderness.   Skin:     Capillary Refill: Capillary refill takes 2 to 3 seconds.      Coloration: Skin is not jaundiced.   Neurological:      Mental Status: He is alert and oriented to person, place, and time.      GCS: GCS eye subscore is 4. GCS verbal subscore is 5. GCS motor subscore is 6.      Motor: No weakness.   Psychiatric:         Mood and Affect: Mood normal.           CRANIAL NERVES     CN III, IV, VI   Pupils are  equal, round, and reactive to light.       Significant Labs:   All pertinent labs within the past 24 hours have been reviewed.  Recent Lab Results       01/19/22  1202   01/19/22  1108   01/19/22  0902        Albumin     3.9       Alkaline Phosphatase     85       ALT     49       Anion Gap     10       AST     35       Baso #     0.08       Basophil %     0.7       BILIRUBIN TOTAL     0.6  Comment: For infants and newborns, interpretation of results should be based  on gestational age, weight and in agreement with clinical  observations.    Premature Infant recommended reference ranges:  Up to 24 hours.............<8.0 mg/dL  Up to 48 hours............<12.0 mg/dL  3-5 days..................<15.0 mg/dL  6-29 days.................<15.0 mg/dL         BNP     <10  Comment: Values of less than 100 pg/ml are consistent with non-CHF populations.       BUN     12       Calcium     10.0       Chloride     98       CO2     26       CPK     104       Creatinine     0.8       Differential Method     Automated       eGFR if      >60       eGFR if non      >60  Comment: Calculation used to obtain the estimated glomerular filtration  rate (eGFR) is the CKD-EPI equation.          Eos #     0.3       Eosinophil %     2.1       Glucose     331       Gran # (ANC)     8.0       Gran %     67.6       Hematocrit     44.2       Hemoglobin     14.6       Immature Grans (Abs)     0.07  Comment: Mild elevation in immature granulocytes is non specific and   can be seen in a variety of conditions including stress response,   acute inflammation, trauma and pregnancy. Correlation with other   laboratory and clinical findings is essential.         Immature Granulocytes     0.6       Lymph #     2.9       Lymph %     24.9       MCH     28.9       MCHC     33.0       MCV     88       Mono #     0.5       Mono %     4.1       MPV     11.6       nRBC     0       Platelets     194       Potassium     4.2       PROTEIN  "TOTAL     7.3        Acceptable   Yes         RBC     5.05       RDW     12.5       SARS-CoV-2 RNA, Amplification, Qual   Negative         Sodium     134       Troponin I 0.011  Comment: The reference interval for Troponin I represents the 99th percentile   cutoff   for our facility and is consistent with 3rd generation assay   performance.       <0.006  Comment: The reference interval for Troponin I represents the 99th percentile   cutoff   for our facility and is consistent with 3rd generation assay   performance.         WBC     11.80             Significant Imaging: I have reviewed all pertinent imaging results/findings within the past 24 hours.     X-Ray Chest PA And Lateral  Order: [id number]   Status: Final result     Visible to patient: Yes (not seen)     Next appt: None     0 Result Notes    Details    Reading Physician Reading Date Result Priority   Norberto Martinez MD  [phone]  [phone] 1/19/2022 STAT     Narrative & Impression  EXAMINATION:  XR CHEST PA AND LATERAL     CLINICAL HISTORY:  Provided history is "Chest Pain;  ".     TECHNIQUE:  Frontal and lateral views of the chest were performed.     COMPARISON:  08/01/2021.     FINDINGS:  Cardiac wires overlie the chest.  Cardiomediastinal silhouette is not enlarged.  Lung volumes are relatively low with slight elevation of the right hemidiaphragm.  No focal consolidation.  No sizable pleural effusion.  No pneumothorax.     Impression:     No acute cardiopulmonary finding.        Electronically signed by: Norberto Martinez MD  Date:                                            01/19/2022  Time:                                           09:31             Exam Ended: 01/19/22 09:26 Last Resulted: 01/19/22 09:31               Assessment/Plan:     * Chest pain  -Cx cardiology  -trend troponin  -continue aspirin 81 mg qd  -atorvastatin 40 mg qd  -lovenox 40 mg qd  -obtain morning draw of PTT, PT/INR  -monitor Mg, Phos, CBC, " BMP  -NPO  -cardiac monitoring  -place in observation        Type 2 diabetes mellitus  -Check A1c  -POCT glucose AC/HS  -hold metformin        Primary hypertension  -Hypertensive, continue to monitor  -losartan 50 mg qd      Seizures  -lamotrigine 200 mg bid  -per pt he was advised by his neurologist to stop taking Keppra on 1/20      GERD (gastroesophageal reflux disease)  -pantoprazole 40 mg qd  -avoid triggers  -GI cocktail prn        History of TIA (transient ischemic attack)  -continue aspirin 81 mg qd      Other hyperlipidemia  -atorvastatin 40 mg qd  -lipid panel    Migraine without aura, not intractable  -tylenol prn      Class 2 obesity in adult  The patient is asked to make an attempt to improve diet and exercise patterns to aid in medical management of this problem. We specifically discussed cutting calorie intake by 500-1000 calories per day for a goal of a 1-2 pound weight loss per week and recommendations for a mostly plant based diet with limited red meats/refined grains/processed foods/added sugars.        VTE Risk Mitigation (From admission, onward)         Ordered     enoxaparin injection 40 mg  Daily         01/19/22 1514     IP VTE HIGH RISK PATIENT  Once         01/19/22 1514     Place sequential compression device  Until discontinued         01/19/22 1514                As clarification, on 01/19/2022, patient should be placed in hospital observation services under my care in collaboration with MD Robin Cabrales NP  Department of Hospital Medicine   [facility] [place] - Emergency Dept

## 2022-01-19 NOTE — ED PROVIDER NOTES
Encounter Date: 1/19/2022       History     Chief Complaint   Patient presents with    Chest Pain     Pt reports left shoulder pain that radiated down his left arm that started yesterday. Reports today the pain radiated to his chest today. Pt reports he had a TIA in August 2021. Reports he took TUMS and GasX today with some relief. Reports he took 81 mg ASA.        Patient presenting with left shoulder pain and chest pain that started yesterday.  He states that yesterday he felt like it was his rotator cuff that was acting up which was unusual because he has not had any increased physical activity.  Overnight last night he had worsening of his chronic GERD with severe indigestion that required multiple doses of Tums and Maalox.  He also reports being diffusely diaphoretic during these times.  This morning he developed associated chest pain with his left shoulder pain that radiates across his left chest to his substernal area.  Also has pain radiating down his left arm.  He is no longer diaphoretic he denies any associated nausea or shortness of breath.    Past medical history significant for obesity, former smoking previous stroke, hypertension and borderline diabetes.    Family history significant for premature coronary artery disease in both mother and father side.        Review of patient's allergies indicates:  No Known Allergies  Past Medical History:   Diagnosis Date    GERD (gastroesophageal reflux disease)     Stroke      No past surgical history on file.  Family History   Problem Relation Age of Onset    Cancer Mother     Cancer Father     Cancer Brother      Social History     Tobacco Use    Smoking status: Current Every Day Smoker     Packs/day: 0.50     Types: Cigarettes    Smokeless tobacco: Never Used   Substance Use Topics    Alcohol use: Yes     Alcohol/week: 0.8 standard drinks     Types: 1 Standard drinks or equivalent per week     Comment: occasionally    Drug use: No     Review of  Systems   Constitutional: Positive for diaphoresis. Negative for fever.   HENT: Negative for facial swelling.    Eyes: Negative for redness.   Respiratory: Negative for shortness of breath.    Cardiovascular: Positive for chest pain. Negative for leg swelling.   Gastrointestinal: Negative for nausea and vomiting.   Musculoskeletal: Negative for gait problem.   Skin: Negative for pallor.   Neurological: Negative for facial asymmetry and light-headedness.   Psychiatric/Behavioral: Negative for confusion.   All other systems reviewed and are negative.      Physical Exam     Initial Vitals [01/19/22 0755]   BP Pulse Resp Temp SpO2   (!) 169/80 92 18 98.2 °F (36.8 °C) 97 %      MAP       --         Physical Exam    Nursing note and vitals reviewed.  Constitutional: He appears well-developed and well-nourished. He is not diaphoretic.     Hypertensive white male with the elevated BMI in no acute distress   HENT:   Head: Normocephalic and atraumatic.   Eyes: EOM are normal.   Neck: Neck supple.   Normal range of motion.  Cardiovascular: Normal rate and regular rhythm.   No murmur heard.  Pulmonary/Chest: No respiratory distress. He has no rhonchi. He has no rales.   Abdominal: Abdomen is soft. He exhibits no distension. There is no rebound.   Musculoskeletal:         General: No edema. Normal range of motion.      Cervical back: Normal range of motion and neck supple.     Neurological: He is alert and oriented to person, place, and time.   Skin: Skin is warm and dry.   Psychiatric: He has a normal mood and affect.         ED Course   Procedures  Labs Reviewed   CBC W/ AUTO DIFFERENTIAL   COMPREHENSIVE METABOLIC PANEL   TROPONIN I   B-TYPE NATRIURETIC PEPTIDE   CK   SARS-COV-2 RDRP GENE     EKG Readings: (Independently Interpreted)   Initial Reading: No STEMI. Rhythm: Normal Sinus Rhythm.   No significant T-wave abnormalities apart from inversions in V1       Imaging Results    None          Medications   aspirin chewable  tablet 162 mg (has no administration in time range)     Medical Decision Making:   Initial Assessment:   Patient presenting with chest pain and shoulder pain.  Differential Diagnosis:   Acute coronary syndrome, pneumonia, PE, rotator cuff injury, musculoskeletal chest pain, chest and shoulder pain otherwise specified.  ED Management:    Although the patient's chest pain is mildly atypical, starting in his left shoulder and radiating centrally, he has multiple cardiac risk factors and despite his relatively young age, he is not low risk by either heart score or ED ACS score and warrants a full cardiac workup delayed troponins.  He is pending his 1st set of cardiac enzymes and will be consulted for observation for high risk chest pain to Hospital Medicine.    In terms of the remaining diagnoses on the differential, the patient is low risk by PERC criteria, his chest x-ray is pending and his possible musculoskeletal etiologies of his pain will be a diagnoses of exclusion.    Silas Oneal MD,   Emergency Medicine  01/19/2022 9:11 AM    (This note was written with voice recognition software.  Please excuse any grammatical errors.)              ED Course as of 01/19/22 2049 Wed Jan 19, 2022   0904 HEART Score  4 points  Moderate Score (4-6 points)    Risk of MACE of 12-16.6%. [CH]   0904 EDACS  16 points  Not low risk. This patient is not a candidate for early discharge and should receive a standard chest pain evaluation with delayed troponin testing. [CH]   1018 Glucose(!): 331  Hyperglycemia no evidence of acidosis [CH]   1019 CO2: 26  Normal CO2 [CH]   1019 Troponin I: <0.006  First troponin is negative, will admit for high risk chest pain [CH]   1019   Patient continues to he chest pain-free.  He has received 162 mg aspirin in addition to the 81 mg that he took prior.  His 1st troponin is negative and he is high risk chest pain.  He will be admitted for observation   [CH]      ED Course User Index  [CH] Silas CORBIN  MD Kimmy             Clinical Impression:   Final diagnoses:  [R07.9] Chest pain                 Silas Oneal MD  01/19/22 2044

## 2022-01-19 NOTE — SUBJECTIVE & OBJECTIVE
Past Medical History:   Diagnosis Date    Cigarette smoker     GERD (gastroesophageal reflux disease)     Hypertension     Loud snoring     Obese body habitus     Stroke 08/01/2021    TIA, RESIDUAL DEFICITS       Past Surgical History:   Procedure Laterality Date    DENIES SURGICAL  HISTORY         Review of patient's allergies indicates:  No Known Allergies    No current facility-administered medications on file prior to encounter.     Current Outpatient Medications on File Prior to Encounter   Medication Sig    ascorbic acid, vitamin C, (VITAMIN C) 100 MG tablet Take 500 mg by mouth once daily.    aspirin (ECOTRIN) 81 MG EC tablet Take 1 tablet (81 mg total) by mouth once daily.    aspirin-acetaminophen-caffeine 250-250-65 mg (EXCEDRIN MIGRAINE) 250-250-65 mg per tablet Take 1 tablet by mouth every 6 (six) hours as needed for Pain.    atorvastatin (LIPITOR) 20 MG tablet Take 1 tablet (20 mg total) by mouth once daily. (Patient taking differently: Take 40 mg by mouth once daily.)    azilsartan medoxomiL (EDARBI) 40 mg Tab Take 20 mg by mouth once daily.    b complex vitamins tablet Take 1 tablet by mouth once daily.    calcium carbonate (TUMS ORAL) Take by mouth.    Lactobacillus rhamnosus GG (CULTURELLE) 10 billion cell capsule Take 1 capsule by mouth once daily.    lamoTRIgine (LAMICTAL) 200 MG tablet Take 200 mg by mouth 2 (two) times daily.    levETIRAcetam (KEPPRA) 750 MG Tab levetiracetam 750 mg tablet   ONE BID    metFORMIN (GLUCOPHAGE) 500 MG tablet Take 1 tablet (500 mg total) by mouth 2 (two) times daily with meals.    omega-3/dha/epa/fish oil (OMEGA-3 FISH OIL ORAL) Take 500 mg by mouth once daily.    pantoprazole (PROTONIX) 40 MG tablet Take 40 mg by mouth once daily.    simethicone (MYLICON) 125 MG chewable tablet Take 125 mg by mouth every 6 (six) hours as needed for Flatulence.    ubrogepant (UBROGEPANT) 50 mg tablet Ubrelvy 50 mg tablet   Take 1 tablet as needed by oral route  as needed for 30 days.    zinc gluconate 50 mg tablet Take 25 mg by mouth once daily. Mon, Wed, Fri    clopidogreL (PLAVIX) 75 mg tablet Take 1 tablet (75 mg total) by mouth once daily. for 20 days    ergocalciferol (ERGOCALCIFEROL) 50,000 unit Cap Take 50,000 Units by mouth every 7 days. Mondays    LORazepam (ATIVAN) 0.5 MG tablet lorazepam 0.5 mg tablet   TAKE 1 TABLET BY MOUTH TWICE A DAY AS NEEDED    [DISCONTINUED] lisinopriL (PRINIVIL,ZESTRIL) 20 MG tablet Take 1 tablet (20 mg total) by mouth once daily.    [DISCONTINUED] omega-3 acid ethyl esters (LOVAZA) 1 gram capsule Take 2 g by mouth 2 (two) times daily.     Family History     Problem Relation (Age of Onset)    Cancer Mother, Father, Brother        Tobacco Use    Smoking status: Current Every Day Smoker     Packs/day: 0.50     Types: Cigarettes    Smokeless tobacco: Never Used   Substance and Sexual Activity    Alcohol use: Yes     Alcohol/week: 0.8 standard drinks     Types: 1 Standard drinks or equivalent per week     Comment: occasionally    Drug use: No    Sexual activity: Yes     Review of Systems   Constitutional: Negative for chills, diaphoresis, fever and malaise/fatigue.   HENT: Negative for nosebleeds.    Eyes: Negative for blurred vision and double vision.   Cardiovascular: Positive for chest pain. Negative for claudication, cyanosis, dyspnea on exertion, leg swelling, orthopnea, palpitations, paroxysmal nocturnal dyspnea and syncope.   Respiratory: Negative for cough, shortness of breath and wheezing.    Skin: Negative for dry skin and poor wound healing.   Musculoskeletal: Negative for back pain, joint swelling and myalgias.   Gastrointestinal: Negative for abdominal pain, nausea and vomiting.   Genitourinary: Negative for hematuria.   Neurological: Negative for dizziness, headaches, numbness, seizures and weakness.   Psychiatric/Behavioral: Negative for altered mental status and depression.     Objective:     Vital Signs (Most  Recent):  Temp: 98.2 °F (36.8 °C) (01/19/22 0755)  Pulse: 93 (01/19/22 1302)  Resp: (!) 22 (01/19/22 1302)  BP: 125/72 (01/19/22 1302)  SpO2: 95 % (01/19/22 1302) Vital Signs (24h Range):  Temp:  [98.2 °F (36.8 °C)] 98.2 °F (36.8 °C)  Pulse:  [85-93] 93  Resp:  [13-22] 22  SpO2:  [95 %-97 %] 95 %  BP: (125-169)/(72-80) 125/72     Weight: 131.5 kg (290 lb)  Body mass index is 40.45 kg/m².    SpO2: 95 %  O2 Device (Oxygen Therapy): room air    No intake or output data in the 24 hours ending 01/19/22 1544    Lines/Drains/Airways     Peripheral Intravenous Line                 Peripheral IV - Single Lumen 01/19/22 1001 20 G Right Antecubital <1 day                Physical Exam  Constitutional:       General: He is not in acute distress.     Appearance: He is well-developed and well-nourished. He is obese. He is not ill-appearing, toxic-appearing or diaphoretic.   HENT:      Head: Normocephalic and atraumatic.   Eyes:      General: No scleral icterus.     Extraocular Movements: Extraocular movements intact and EOM normal.      Conjunctiva/sclera: Conjunctivae normal.      Pupils: Pupils are equal, round, and reactive to light.   Neck:      Thyroid: No thyromegaly.      Vascular: No JVD.      Trachea: No tracheal deviation.   Cardiovascular:      Rate and Rhythm: Normal rate and regular rhythm.      Heart sounds: S1 normal and S2 normal. No murmur heard.  No friction rub. No gallop.    Pulmonary:      Effort: Pulmonary effort is normal. No respiratory distress.      Breath sounds: Normal breath sounds. No stridor. No wheezing, rhonchi or rales.   Chest:      Chest wall: No tenderness.   Abdominal:      General: There is no distension.      Palpations: Abdomen is soft.   Musculoskeletal:         General: No swelling, tenderness or edema. Normal range of motion.      Cervical back: Normal range of motion and neck supple. No rigidity.      Right lower leg: No edema.      Left lower leg: No edema.   Skin:     General: Skin is  warm and dry.      Coloration: Skin is not jaundiced.      Findings: No rash.   Neurological:      General: No focal deficit present.      Mental Status: He is alert and oriented to person, place, and time.      Cranial Nerves: No cranial nerve deficit.      Deep Tendon Reflexes: Strength normal.   Psychiatric:         Mood and Affect: Mood and affect and mood normal.         Behavior: Behavior normal.         Current Medications:   [START ON 1/20/2022] aspirin  81 mg Oral Daily    [START ON 1/20/2022] atorvastatin  40 mg Oral Daily    enoxaparin  40 mg Subcutaneous Daily    lamoTRIgine  200 mg Oral BID    levETIRAcetam  500 mg Oral Nightly    [START ON 1/20/2022] losartan  50 mg Oral Daily    [START ON 1/20/2022] omega 3-dha-epa-fish oil   Oral Daily    [START ON 1/20/2022] pantoprazole  40 mg Oral Daily    polyethylene glycol  17 g Oral Daily       acetaminophen, aluminum-magnesium hydroxide-simethicone, dextrose 50%, dextrose 50%, glucagon (human recombinant), glucose, glucose, insulin aspart U-100, melatonin, naloxone, ondansetron, prochlorperazine, simethicone, sodium chloride 0.9%    Laboratory (all labs reviewed):  CBC:  Recent Labs   Lab 08/01/21 1906 08/01/21 1910 08/02/21  0433 01/19/22  0902   WBC 16.12 H  --  13.88 H 11.80   Hemoglobin 16.2  --  14.9 14.6   POC Hematocrit  --  47  --   --    Hematocrit 46.2  --  45.6 44.2   Platelets 219  --  186 194       CHEMISTRIES:  Recent Labs   Lab 08/01/21 1906 08/02/21  0433 01/19/22  0902   Glucose 221 H 259 H 331 H   Sodium 140 139 134 L   Potassium 4.0 4.3 4.2   BUN 10 11 12   Creatinine 1.0 1.0 0.8   eGFR if African American >60 >60 >60   eGFR if non African American >60 >60 >60   Calcium 9.7 9.6 10.0   Magnesium  --  1.9  --        CARDIAC BIOMARKERS:  Recent Labs   Lab 08/01/21 1906 01/19/22  0902 01/19/22  1202   CPK  --  104  --    Troponin I <0.006 <0.006 0.011       COAGS:  Recent Labs   Lab 08/01/21  1906 08/02/21  0433   INR 1.0 1.0        LIPIDS/LFTS:  Recent Labs   Lab 08/01/21  1906 08/02/21  0433 01/19/22  0902   Cholesterol 218 H  --   --    Triglycerides 281 H  --   --    HDL 33 L  --   --    LDL Cholesterol 128.8  --   --    Non-HDL Cholesterol 185  --   --    AST 30 22 35   ALT 40 34 49 H       BNP:  Recent Labs   Lab 01/19/22  0902   BNP <10       TSH:  Recent Labs   Lab 08/01/21 1906   TSH 1.885       Free T4:        Diagnostic Results:  ECG (personally reviewed and interpreted tracing(s)):  1/19/22 -0751 SR 92, LAD/PRWP, similar to 8/1/21    Chest X-Ray (personally reviewed and interpreted image(s)): 1/19/22 NAD    Therese MPI: ordered    Echo: 8/2/21 (repeat ordered)  · The left ventricle is normal in size with concentric remodeling and normal systolic function.  · The estimated ejection fraction is 60%.  · Indeterminate left ventricular diastolic function.  · Normal right ventricular size with normal right ventricular systolic function.  · Normal central venous pressure (3 mmHg).  · The estimated PA systolic pressure is 10 mmHg.  · There is no pulmonary hypertension.

## 2022-01-19 NOTE — ASSESSMENT & PLAN NOTE
Concerning sxs in pt with mult RF  EKG/trop neg  Check echo and Therese MPI in am (pt unable to exercise)

## 2022-01-19 NOTE — ED NOTES
45 yo male to ED for chest pain that radiate to left arm that began on yesterday. PT took Tums and 81 mg Aspirin PTA. Recent hx of TIA 8/2021. Pt is AAOx4, NAD, VSS. Denies CP, SOB, N/V/D, Cough, Fever, Chills, lightheadedness. Pt placed on BP, pulse ox,and cardiac monitors; side rails up x2; bed in lowest position and call light in reach. Instructed to call for assistance.

## 2022-01-19 NOTE — PHARMACY MED REC
"Admission Medication History     The home medication history was taken by Myra Keane CPhT.      You may go to "Admission" then "Reconcile Home Medications" tabs to review and/or act upon these items.      The home medication list has been updated by the Pharmacy department.    Please read ALL comments highlighted in yellow.    Please address this information as you see fit.     Feel free to contact us if you have any questions or require assistance.      The medications listed below were removed from the home medication list. Please reorder if appropriate:  Patient reports no longer taking the following medication(s):   lisinopril 20 mg            Potential issues to be addressed PRIOR TO DISCHARGE  Patient reported not taking the following medications: ((). These medications remain on the home medication list. Please address accordingly.   Clopidogrel 75 mg      Patient verified medications at the bedside along with wife also a list of medications were available.    Myra Keane CPhT.  [phone]                    .          "

## 2022-01-19 NOTE — ASSESSMENT & PLAN NOTE
-Cx cardiology  -trend troponin  -continue aspirin 81 mg qd  -atorvastatin 40 mg qd  -lovenox 40 mg qd  -obtain morning draw of PTT, PT/INR  -monitor Mg, Phos, CBC, BMP  -NPO  -cardiac monitoring  -place in observation

## 2022-01-19 NOTE — SUBJECTIVE & OBJECTIVE
Past Medical History:   Diagnosis Date    Cigarette smoker     GERD (gastroesophageal reflux disease)     Hypertension     Loud snoring     Obese body habitus     Stroke 08/01/2021    TIA, RESIDUAL DEFICITS       Past Surgical History:   Procedure Laterality Date    DENIES SURGICAL  HISTORY         Review of patient's allergies indicates:  No Known Allergies    No current facility-administered medications on file prior to encounter.     Current Outpatient Medications on File Prior to Encounter   Medication Sig    ascorbic acid, vitamin C, (VITAMIN C) 100 MG tablet Take 500 mg by mouth once daily.    aspirin (ECOTRIN) 81 MG EC tablet Take 1 tablet (81 mg total) by mouth once daily.    aspirin-acetaminophen-caffeine 250-250-65 mg (EXCEDRIN MIGRAINE) 250-250-65 mg per tablet Take 1 tablet by mouth every 6 (six) hours as needed for Pain.    atorvastatin (LIPITOR) 20 MG tablet Take 1 tablet (20 mg total) by mouth once daily. (Patient taking differently: Take 40 mg by mouth once daily.)    azilsartan medoxomiL (EDARBI) 40 mg Tab Take 20 mg by mouth once daily.    b complex vitamins tablet Take 1 tablet by mouth once daily.    calcium carbonate (TUMS ORAL) Take by mouth.    Lactobacillus rhamnosus GG (CULTURELLE) 10 billion cell capsule Take 1 capsule by mouth once daily.    lamoTRIgine (LAMICTAL) 200 MG tablet Take 200 mg by mouth 2 (two) times daily.    levETIRAcetam (KEPPRA) 750 MG Tab levetiracetam 750 mg tablet   ONE BID    metFORMIN (GLUCOPHAGE) 500 MG tablet Take 1 tablet (500 mg total) by mouth 2 (two) times daily with meals.    omega-3/dha/epa/fish oil (OMEGA-3 FISH OIL ORAL) Take 500 mg by mouth once daily.    pantoprazole (PROTONIX) 40 MG tablet Take 40 mg by mouth once daily.    simethicone (MYLICON) 125 MG chewable tablet Take 125 mg by mouth every 6 (six) hours as needed for Flatulence.    ubrogepant (UBROGEPANT) 50 mg tablet Ubrelvy 50 mg tablet   Take 1 tablet as needed by oral route  as needed for 30 days.    zinc gluconate 50 mg tablet Take 25 mg by mouth once daily. Mon, Wed, Fri    clopidogreL (PLAVIX) 75 mg tablet Take 1 tablet (75 mg total) by mouth once daily. for 20 days    ergocalciferol (ERGOCALCIFEROL) 50,000 unit Cap Take 50,000 Units by mouth every 7 days. Mondays    LORazepam (ATIVAN) 0.5 MG tablet lorazepam 0.5 mg tablet   TAKE 1 TABLET BY MOUTH TWICE A DAY AS NEEDED    [DISCONTINUED] lisinopriL (PRINIVIL,ZESTRIL) 20 MG tablet Take 1 tablet (20 mg total) by mouth once daily.    [DISCONTINUED] omega-3 acid ethyl esters (LOVAZA) 1 gram capsule Take 2 g by mouth 2 (two) times daily.     Family History     Problem Relation (Age of Onset)    Cancer Mother, Father, Brother        Tobacco Use    Smoking status: Current Every Day Smoker     Packs/day: 0.50     Types: Cigarettes    Smokeless tobacco: Never Used   Substance and Sexual Activity    Alcohol use: Yes     Alcohol/week: 0.8 standard drinks     Types: 1 Standard drinks or equivalent per week     Comment: occasionally    Drug use: No    Sexual activity: Yes     Review of Systems   Constitutional: Positive for diaphoresis (resolved) and unexpected weight change. Negative for chills and fever.   HENT: Negative for sore throat and trouble swallowing.    Eyes: Negative for visual disturbance.   Respiratory: Positive for chest tightness. Negative for cough, shortness of breath and wheezing.    Cardiovascular: Positive for chest pain (rated 2/10). Negative for palpitations and leg swelling.   Gastrointestinal: Negative for abdominal pain, constipation, diarrhea, nausea and vomiting.   Endocrine: Negative for polydipsia, polyphagia and polyuria.   Genitourinary: Negative for decreased urine volume, dysuria and hematuria.   Musculoskeletal: Negative for arthralgias and myalgias.   Skin: Negative for color change and rash.   Neurological: Negative for dizziness, weakness, light-headedness and headaches.   Psychiatric/Behavioral:  Negative for confusion, decreased concentration, sleep disturbance and suicidal ideas.     Objective:     Vital Signs (Most Recent):  Temp: 98.2 °F (36.8 °C) (01/19/22 0755)  Pulse: 93 (01/19/22 1302)  Resp: (!) 22 (01/19/22 1302)  BP: 125/72 (01/19/22 1302)  SpO2: 95 % (01/19/22 1302) Vital Signs (24h Range):  Temp:  [98.2 °F (36.8 °C)] 98.2 °F (36.8 °C)  Pulse:  [85-93] 93  Resp:  [13-22] 22  SpO2:  [95 %-97 %] 95 %  BP: (125-169)/(72-80) 125/72     Weight: 131.5 kg (290 lb)  Body mass index is 40.45 kg/m².    Physical Exam  Constitutional:       General: He is not in acute distress.     Appearance: He is obese. He is not ill-appearing or diaphoretic.   HENT:      Head: Normocephalic and atraumatic.      Mouth/Throat:      Mouth: Mucous membranes are moist.   Eyes:      General: No scleral icterus.        Right eye: No discharge.         Left eye: No discharge.      Pupils: Pupils are equal, round, and reactive to light.   Cardiovascular:      Rate and Rhythm: Normal rate and regular rhythm.      Heart sounds: No murmur heard.      Pulmonary:      Effort: No respiratory distress.      Breath sounds: No wheezing.   Abdominal:      Tenderness: There is no abdominal tenderness.   Musculoskeletal:         General: No swelling or signs of injury.      Cervical back: Normal range of motion. No tenderness.   Skin:     Capillary Refill: Capillary refill takes 2 to 3 seconds.      Coloration: Skin is not jaundiced.   Neurological:      Mental Status: He is alert and oriented to person, place, and time.      GCS: GCS eye subscore is 4. GCS verbal subscore is 5. GCS motor subscore is 6.      Motor: No weakness.   Psychiatric:         Mood and Affect: Mood normal.           CRANIAL NERVES     CN III, IV, VI   Pupils are equal, round, and reactive to light.       Significant Labs:   All pertinent labs within the past 24 hours have been reviewed.  Recent Lab Results       01/19/22  1202   01/19/22  1108   01/19/22  0902         Albumin     3.9       Alkaline Phosphatase     85       ALT     49       Anion Gap     10       AST     35       Baso #     0.08       Basophil %     0.7       BILIRUBIN TOTAL     0.6  Comment: For infants and newborns, interpretation of results should be based  on gestational age, weight and in agreement with clinical  observations.    Premature Infant recommended reference ranges:  Up to 24 hours.............<8.0 mg/dL  Up to 48 hours............<12.0 mg/dL  3-5 days..................<15.0 mg/dL  6-29 days.................<15.0 mg/dL         BNP     <10  Comment: Values of less than 100 pg/ml are consistent with non-CHF populations.       BUN     12       Calcium     10.0       Chloride     98       CO2     26       CPK     104       Creatinine     0.8       Differential Method     Automated       eGFR if      >60       eGFR if non      >60  Comment: Calculation used to obtain the estimated glomerular filtration  rate (eGFR) is the CKD-EPI equation.          Eos #     0.3       Eosinophil %     2.1       Glucose     331       Gran # (ANC)     8.0       Gran %     67.6       Hematocrit     44.2       Hemoglobin     14.6       Immature Grans (Abs)     0.07  Comment: Mild elevation in immature granulocytes is non specific and   can be seen in a variety of conditions including stress response,   acute inflammation, trauma and pregnancy. Correlation with other   laboratory and clinical findings is essential.         Immature Granulocytes     0.6       Lymph #     2.9       Lymph %     24.9       MCH     28.9       MCHC     33.0       MCV     88       Mono #     0.5       Mono %     4.1       MPV     11.6       nRBC     0       Platelets     194       Potassium     4.2       PROTEIN TOTAL     7.3        Acceptable   Yes         RBC     5.05       RDW     12.5       SARS-CoV-2 RNA, Amplification, Qual   Negative         Sodium     134       Troponin I 0.011  Comment: The  "reference interval for Troponin I represents the 99th percentile   cutoff   for our facility and is consistent with 3rd generation assay   performance.       <0.006  Comment: The reference interval for Troponin I represents the 99th percentile   cutoff   for our facility and is consistent with 3rd generation assay   performance.         WBC     11.80             Significant Imaging: I have reviewed all pertinent imaging results/findings within the past 24 hours.     X-Ray Chest PA And Lateral  Order: [id number]   Status: Final result     Visible to patient: Yes (not seen)     Next appt: None     0 Result Notes    Details    Reading Physician Reading Date Result Priority   Norberto Martinez MD  [phone]  [phone] 1/19/2022 STAT     Narrative & Impression  EXAMINATION:  XR CHEST PA AND LATERAL     CLINICAL HISTORY:  Provided history is "Chest Pain;  ".     TECHNIQUE:  Frontal and lateral views of the chest were performed.     COMPARISON:  08/01/2021.     FINDINGS:  Cardiac wires overlie the chest.  Cardiomediastinal silhouette is not enlarged.  Lung volumes are relatively low with slight elevation of the right hemidiaphragm.  No focal consolidation.  No sizable pleural effusion.  No pneumothorax.     Impression:     No acute cardiopulmonary finding.        Electronically signed by: Norberto Martinez MD  Date:                                            01/19/2022  Time:                                           09:31             Exam Ended: 01/19/22 09:26 Last Resulted: 01/19/22 09:31             "

## 2022-01-19 NOTE — PLAN OF CARE
01/19/22 1348   Discharge Planning   Assessment Type Discharge Planning Brief Assessment   Resource/Environmental Concerns none   Support Systems Spouse/significant other   Equipment Currently Used at Home none   Patient/Family Anticipates Transition to home with family   Patient/Family Anticipated Services at Transition none   DME Needed Upon Discharge  none   Discharge Plan A Home with family  (with follow up)     [id number] - [place], [place] - [address] AT [facility] & [address] [address]  Phone: [phone] Fax: [phone]    [id number] - [place] - [address]  [address] [address]  Phone: [phone] Fax: [phone]

## 2022-01-20 VITALS
OXYGEN SATURATION: 98 % | RESPIRATION RATE: 20 BRPM | BODY MASS INDEX: 40.6 KG/M2 | SYSTOLIC BLOOD PRESSURE: 129 MMHG | DIASTOLIC BLOOD PRESSURE: 72 MMHG | TEMPERATURE: 98 F | HEIGHT: 71 IN | WEIGHT: 290 LBS | HEART RATE: 97 BPM

## 2022-01-20 LAB
ANION GAP SERPL CALC-SCNC: 12 MMOL/L (ref 8–16)
AORTIC ROOT ANNULUS: 2.89 CM
AORTIC VALVE CUSP SEPERATION: 2.26 CM
APTT BLDCRRT: 27.3 SEC (ref 21–32)
ASCENDING AORTA: 3.49 CM
AV INDEX (PROSTH): 0.68
AV MEAN GRADIENT: 5 MMHG
AV PEAK GRADIENT: 7 MMHG
AV VALVE AREA: 2.34 CM2
AV VELOCITY RATIO: 0.64
BASOPHILS # BLD AUTO: 0.07 K/UL (ref 0–0.2)
BASOPHILS NFR BLD: 0.5 % (ref 0–1.9)
BSA FOR ECHO PROCEDURE: 2.57 M2
BUN SERPL-MCNC: 11 MG/DL (ref 6–20)
CALCIUM SERPL-MCNC: 9.4 MG/DL (ref 8.7–10.5)
CHLORIDE SERPL-SCNC: 100 MMOL/L (ref 95–110)
CHOLEST SERPL-MCNC: 151 MG/DL (ref 120–199)
CHOLEST/HDLC SERPL: 5 {RATIO} (ref 2–5)
CO2 SERPL-SCNC: 25 MMOL/L (ref 23–29)
CREAT SERPL-MCNC: 0.8 MG/DL (ref 0.5–1.4)
CV ECHO LV RWT: 0.56 CM
CV STRESS BASE HR: 104 BPM
DIASTOLIC BLOOD PRESSURE: 78 MMHG
DIFFERENTIAL METHOD: ABNORMAL
DOP CALC AO PEAK VEL: 1.3 M/S
DOP CALC AO VTI: 20.63 CM
DOP CALC LVOT AREA: 3.4 CM2
DOP CALC LVOT DIAMETER: 2.09 CM
DOP CALC LVOT PEAK VEL: 0.83 M/S
DOP CALC LVOT STROKE VOLUME: 48.18 CM3
DOP CALCLVOT PEAK VEL VTI: 14.05 CM
E/E' RATIO: 8.29 M/S
ECHO LV POSTERIOR WALL: 1.27 CM (ref 0.6–1.1)
EJECTION FRACTION: 60 %
EOSINOPHIL # BLD AUTO: 0.4 K/UL (ref 0–0.5)
EOSINOPHIL NFR BLD: 2.3 % (ref 0–8)
ERYTHROCYTE [DISTWIDTH] IN BLOOD BY AUTOMATED COUNT: 12.6 % (ref 11.5–14.5)
EST. GFR  (AFRICAN AMERICAN): >60 ML/MIN/1.73 M^2
EST. GFR  (NON AFRICAN AMERICAN): >60 ML/MIN/1.73 M^2
FRACTIONAL SHORTENING: 37 % (ref 28–44)
GLUCOSE SERPL-MCNC: 214 MG/DL (ref 70–110)
HCT VFR BLD AUTO: 45 % (ref 40–54)
HDLC SERPL-MCNC: 30 MG/DL (ref 40–75)
HDLC SERPL: 19.9 % (ref 20–50)
HGB BLD-MCNC: 14.7 G/DL (ref 14–18)
IMM GRANULOCYTES # BLD AUTO: 0.08 K/UL (ref 0–0.04)
IMM GRANULOCYTES NFR BLD AUTO: 0.5 % (ref 0–0.5)
INR PPP: 1 (ref 0.8–1.2)
INTERVENTRICULAR SEPTUM: 1.32 CM (ref 0.6–1.1)
IVRT: 131.49 MSEC
LA MAJOR: 4.78 CM
LA MINOR: 5.99 CM
LA WIDTH: 3.76 CM
LDLC SERPL CALC-MCNC: 67.8 MG/DL (ref 63–159)
LEFT ATRIUM SIZE: 4.2 CM
LEFT ATRIUM VOLUME INDEX: 28.9 ML/M2
LEFT ATRIUM VOLUME: 71.37 CM3
LEFT INTERNAL DIMENSION IN SYSTOLE: 2.88 CM (ref 2.1–4)
LEFT VENTRICLE DIASTOLIC VOLUME INDEX: 38.45 ML/M2
LEFT VENTRICLE DIASTOLIC VOLUME: 94.98 ML
LEFT VENTRICLE MASS INDEX: 91 G/M2
LEFT VENTRICLE SYSTOLIC VOLUME INDEX: 12.8 ML/M2
LEFT VENTRICLE SYSTOLIC VOLUME: 31.67 ML
LEFT VENTRICULAR INTERNAL DIMENSION IN DIASTOLE: 4.55 CM (ref 3.5–6)
LEFT VENTRICULAR MASS: 225.07 G
LV LATERAL E/E' RATIO: 7.25 M/S
LV SEPTAL E/E' RATIO: 9.67 M/S
LYMPHOCYTES # BLD AUTO: 4.4 K/UL (ref 1–4.8)
LYMPHOCYTES NFR BLD: 29.1 % (ref 18–48)
MAGNESIUM SERPL-MCNC: 2.1 MG/DL (ref 1.6–2.6)
MCH RBC QN AUTO: 29.5 PG (ref 27–31)
MCHC RBC AUTO-ENTMCNC: 32.7 G/DL (ref 32–36)
MCV RBC AUTO: 90 FL (ref 82–98)
MONOCYTES # BLD AUTO: 0.8 K/UL (ref 0.3–1)
MONOCYTES NFR BLD: 5.4 % (ref 4–15)
MV PEAK E VEL: 0.58 M/S
NEUTROPHILS # BLD AUTO: 9.3 K/UL (ref 1.8–7.7)
NEUTROPHILS NFR BLD: 62.2 % (ref 38–73)
NONHDLC SERPL-MCNC: 121 MG/DL
NRBC BLD-RTO: 0 /100 WBC
NUC STRESS DIASTOLIC VOLUME INDEX: 89
NUC STRESS EJECTION FRACTION: 52 %
NUC STRESS SYSTOLIC VOLUME INDEX: 43
OHS CV CPX 85 PERCENT MAX PREDICTED HEART RATE MALE: 148
OHS CV CPX MAX PREDICTED HEART RATE: 174
OHS CV CPX PATIENT IS FEMALE: 0
OHS CV CPX PATIENT IS MALE: 1
OHS CV CPX PEAK DIASTOLIC BLOOD PRESSURE: 79 MMHG
OHS CV CPX PEAK HEAR RATE: 134 BPM
OHS CV CPX PEAK RATE PRESSURE PRODUCT: NORMAL
OHS CV CPX PEAK SYSTOLIC BLOOD PRESSURE: 121 MMHG
OHS CV CPX PERCENT MAX PREDICTED HEART RATE ACHIEVED: 77
OHS CV CPX RATE PRESSURE PRODUCT PRESENTING: NORMAL
PHOSPHATE SERPL-MCNC: 3.9 MG/DL (ref 2.7–4.5)
PISA TR MAX VEL: 1.04 M/S
PLATELET # BLD AUTO: 208 K/UL (ref 150–450)
PMV BLD AUTO: 11.6 FL (ref 9.2–12.9)
POCT GLUCOSE: 213 MG/DL (ref 70–110)
POTASSIUM SERPL-SCNC: 4.1 MMOL/L (ref 3.5–5.1)
PROTHROMBIN TIME: 10.7 SEC (ref 9–12.5)
PULM VEIN S/D RATIO: 1.42
PV PEAK D VEL: 0.43 M/S
PV PEAK S VEL: 0.61 M/S
PV PEAK VELOCITY: 1.48 CM/S
RA MAJOR: 4.32 CM
RA PRESSURE: 8 MMHG
RA WIDTH: 3.39 CM
RBC # BLD AUTO: 4.99 M/UL (ref 4.6–6.2)
RIGHT VENTRICULAR END-DIASTOLIC DIMENSION: 3.27 CM
RV TISSUE DOPPLER FREE WALL SYSTOLIC VELOCITY 1 (APICAL 4 CHAMBER VIEW): 19.15 CM/S
SINUS: 3.46 CM
SODIUM SERPL-SCNC: 137 MMOL/L (ref 136–145)
STJ: 3.03 CM
SYSTOLIC BLOOD PRESSURE: 124 MMHG
TDI LATERAL: 0.08 M/S
TDI SEPTAL: 0.06 M/S
TDI: 0.07 M/S
TR MAX PG: 4 MMHG
TRICUSPID ANNULAR PLANE SYSTOLIC EXCURSION: 2.34 CM
TRIGL SERPL-MCNC: 266 MG/DL (ref 30–150)
TV REST PULMONARY ARTERY PRESSURE: 12 MMHG
WBC # BLD AUTO: 15 K/UL (ref 3.9–12.7)

## 2022-01-20 PROCEDURE — 99225 PR SUBSEQUENT OBSERVATION CARE,LEVEL II: ICD-10-PCS | Mod: ,,, | Performed by: INTERNAL MEDICINE

## 2022-01-20 PROCEDURE — 25000003 PHARM REV CODE 250: Performed by: STUDENT IN AN ORGANIZED HEALTH CARE EDUCATION/TRAINING PROGRAM

## 2022-01-20 PROCEDURE — 84100 ASSAY OF PHOSPHORUS: CPT | Performed by: STUDENT IN AN ORGANIZED HEALTH CARE EDUCATION/TRAINING PROGRAM

## 2022-01-20 PROCEDURE — 80048 BASIC METABOLIC PNL TOTAL CA: CPT | Performed by: STUDENT IN AN ORGANIZED HEALTH CARE EDUCATION/TRAINING PROGRAM

## 2022-01-20 PROCEDURE — 83735 ASSAY OF MAGNESIUM: CPT | Performed by: STUDENT IN AN ORGANIZED HEALTH CARE EDUCATION/TRAINING PROGRAM

## 2022-01-20 PROCEDURE — 99225 PR SUBSEQUENT OBSERVATION CARE,LEVEL II: CPT | Mod: ,,, | Performed by: INTERNAL MEDICINE

## 2022-01-20 PROCEDURE — 36415 COLL VENOUS BLD VENIPUNCTURE: CPT | Performed by: STUDENT IN AN ORGANIZED HEALTH CARE EDUCATION/TRAINING PROGRAM

## 2022-01-20 PROCEDURE — G0378 HOSPITAL OBSERVATION PER HR: HCPCS

## 2022-01-20 PROCEDURE — 80061 LIPID PANEL: CPT | Performed by: STUDENT IN AN ORGANIZED HEALTH CARE EDUCATION/TRAINING PROGRAM

## 2022-01-20 PROCEDURE — 85025 COMPLETE CBC W/AUTO DIFF WBC: CPT | Performed by: STUDENT IN AN ORGANIZED HEALTH CARE EDUCATION/TRAINING PROGRAM

## 2022-01-20 PROCEDURE — 85610 PROTHROMBIN TIME: CPT | Performed by: STUDENT IN AN ORGANIZED HEALTH CARE EDUCATION/TRAINING PROGRAM

## 2022-01-20 PROCEDURE — 85730 THROMBOPLASTIN TIME PARTIAL: CPT | Performed by: STUDENT IN AN ORGANIZED HEALTH CARE EDUCATION/TRAINING PROGRAM

## 2022-01-20 RX ORDER — REGADENOSON 0.08 MG/ML
0.4 INJECTION, SOLUTION INTRAVENOUS ONCE
Status: DISCONTINUED | OUTPATIENT
Start: 2022-01-20 | End: 2022-01-20 | Stop reason: HOSPADM

## 2022-01-20 RX ORDER — ATORVASTATIN CALCIUM 40 MG/1
40 TABLET, FILM COATED ORAL DAILY
Qty: 90 TABLET | Refills: 3 | Status: SHIPPED | OUTPATIENT
Start: 2022-01-20 | End: 2023-01-20

## 2022-01-20 RX ADMIN — ATORVASTATIN CALCIUM 40 MG: 40 TABLET, FILM COATED ORAL at 10:01

## 2022-01-20 RX ADMIN — PANTOPRAZOLE SODIUM 40 MG: 40 TABLET, DELAYED RELEASE ORAL at 10:01

## 2022-01-20 RX ADMIN — LAMOTRIGINE 200 MG: 100 TABLET ORAL at 10:01

## 2022-01-20 RX ADMIN — LOSARTAN POTASSIUM 50 MG: 25 TABLET, FILM COATED ORAL at 10:01

## 2022-01-20 RX ADMIN — POLYETHYLENE GLYCOL 3350 17 G: 17 POWDER, FOR SOLUTION ORAL at 10:01

## 2022-01-20 RX ADMIN — ASPIRIN 81 MG: 81 TABLET, COATED ORAL at 10:01

## 2022-01-20 NOTE — PROGRESS NOTES
[facility]  Cardiology  Progress Note    Patient Name: Kristian Singh  MRN: [id number]  Admission Date: 1/19/2022  Hospital Length of Stay: 0 days  Code Status: Full Code   Attending Physician: Júnior Hwang MD   Primary Care Physician: Primary Doctor No  Expected Discharge Date: 1/20/2022  Principal Problem:Chest pain    Subjective:     Interval Hx: shoulder pain persists, not so much CP.  Improved with flexeril.        Past Medical History:   Diagnosis Date    Cigarette smoker     GERD (gastroesophageal reflux disease)     Hypertension     Loud snoring     Obese body habitus     Stroke 08/01/2021    TIA, RESIDUAL DEFICITS       Past Surgical History:   Procedure Laterality Date    DENIES SURGICAL  HISTORY         Review of patient's allergies indicates:  No Known Allergies    No current facility-administered medications on file prior to encounter.     Current Outpatient Medications on File Prior to Encounter   Medication Sig    ascorbic acid, vitamin C, (VITAMIN C) 100 MG tablet Take 500 mg by mouth once daily.    aspirin (ECOTRIN) 81 MG EC tablet Take 1 tablet (81 mg total) by mouth once daily.    aspirin-acetaminophen-caffeine 250-250-65 mg (EXCEDRIN MIGRAINE) 250-250-65 mg per tablet Take 1 tablet by mouth every 6 (six) hours as needed for Pain.    atorvastatin (LIPITOR) 20 MG tablet Take 1 tablet (20 mg total) by mouth once daily. (Patient taking differently: Take 40 mg by mouth once daily.)    azilsartan medoxomiL (EDARBI) 40 mg Tab Take 20 mg by mouth once daily.    b complex vitamins tablet Take 1 tablet by mouth once daily.    calcium carbonate (TUMS ORAL) Take by mouth.    Lactobacillus rhamnosus GG (CULTURELLE) 10 billion cell capsule Take 1 capsule by mouth once daily.    lamoTRIgine (LAMICTAL) 200 MG tablet Take 200 mg by mouth 2 (two) times daily.    levETIRAcetam (KEPPRA) 750 MG Tab levetiracetam 750 mg tablet   ONE BID    metFORMIN (GLUCOPHAGE) 500 MG tablet  Take 1 tablet (500 mg total) by mouth 2 (two) times daily with meals.    omega-3/dha/epa/fish oil (OMEGA-3 FISH OIL ORAL) Take 500 mg by mouth once daily.    pantoprazole (PROTONIX) 40 MG tablet Take 40 mg by mouth once daily.    simethicone (MYLICON) 125 MG chewable tablet Take 125 mg by mouth every 6 (six) hours as needed for Flatulence.    ubrogepant (UBROGEPANT) 50 mg tablet Ubrelvy 50 mg tablet   Take 1 tablet as needed by oral route as needed for 30 days.    zinc gluconate 50 mg tablet Take 25 mg by mouth once daily. Mon, Wed, Fri    clopidogreL (PLAVIX) 75 mg tablet Take 1 tablet (75 mg total) by mouth once daily. for 20 days    ergocalciferol (ERGOCALCIFEROL) 50,000 unit Cap Take 50,000 Units by mouth every 7 days. Mondays    LORazepam (ATIVAN) 0.5 MG tablet lorazepam 0.5 mg tablet   TAKE 1 TABLET BY MOUTH TWICE A DAY AS NEEDED     Family History     Problem Relation (Age of Onset)    Cancer Mother, Father, Brother        Tobacco Use    Smoking status: Current Every Day Smoker     Packs/day: 0.50     Types: Cigarettes    Smokeless tobacco: Never Used   Substance and Sexual Activity    Alcohol use: Yes     Alcohol/week: 0.8 standard drinks     Types: 1 Standard drinks or equivalent per week     Comment: occasionally    Drug use: No    Sexual activity: Yes     Review of Systems   Gastrointestinal: Negative for melena.   Genitourinary: Negative for hematuria.     Objective:     Vital Signs (Most Recent):  Temp: 98.4 °F (36.9 °C) (01/20/22 0449)  Pulse: 88 (01/20/22 0449)  Resp: 18 (01/20/22 0449)  BP: 119/64 (01/20/22 0449)  SpO2: (!) 94 % (01/20/22 0449) Vital Signs (24h Range):  Temp:  [97.2 °F (36.2 °C)-98.7 °F (37.1 °C)] 98.4 °F (36.9 °C)  Pulse:  [85-93] 88  Resp:  [13-22] 18  SpO2:  [94 %-97 %] 94 %  BP: (119-169)/(64-80) 119/64     Weight: 131.5 kg (290 lb)  Body mass index is 40.45 kg/m².    SpO2: (!) 94 %  O2 Device (Oxygen Therapy): room air    No intake or output data in the 24 hours  ending 01/20/22 0638    Lines/Drains/Airways     Peripheral Intravenous Line                 Peripheral IV - Single Lumen 01/19/22 1001 20 G Right Antecubital <1 day              Exam unchanged vs 1/19/22  Physical Exam  Constitutional:       General: He is not in acute distress.     Appearance: He is well-developed. He is obese. He is not ill-appearing, toxic-appearing or diaphoretic.   HENT:      Head: Normocephalic and atraumatic.   Eyes:      General: No scleral icterus.     Extraocular Movements: Extraocular movements intact.      Conjunctiva/sclera: Conjunctivae normal.      Pupils: Pupils are equal, round, and reactive to light.   Neck:      Thyroid: No thyromegaly.      Vascular: No JVD.      Trachea: No tracheal deviation.   Cardiovascular:      Rate and Rhythm: Normal rate and regular rhythm.      Heart sounds: S1 normal and S2 normal. No murmur heard.  No friction rub. No gallop.    Pulmonary:      Effort: Pulmonary effort is normal. No respiratory distress.      Breath sounds: Normal breath sounds. No stridor. No wheezing, rhonchi or rales.   Chest:      Chest wall: No tenderness.   Abdominal:      General: There is no distension.      Palpations: Abdomen is soft.   Musculoskeletal:         General: No swelling or tenderness. Normal range of motion.      Cervical back: Normal range of motion and neck supple. No rigidity.      Right lower leg: No edema.      Left lower leg: No edema.   Skin:     General: Skin is warm and dry.      Coloration: Skin is not jaundiced.      Findings: No rash.   Neurological:      General: No focal deficit present.      Mental Status: He is alert and oriented to person, place, and time.      Cranial Nerves: No cranial nerve deficit.   Psychiatric:         Mood and Affect: Mood normal.         Behavior: Behavior normal.         Current Medications:   aspirin  81 mg Oral Daily    atorvastatin  40 mg Oral Daily    enoxaparin  40 mg Subcutaneous Daily    insulin detemir U-100   8 Units Subcutaneous QHS    lamoTRIgine  200 mg Oral BID    levETIRAcetam  500 mg Oral Nightly    losartan  50 mg Oral Daily    omega 3-dha-epa-fish oil   Oral Daily    pantoprazole  40 mg Oral Daily    polyethylene glycol  17 g Oral Daily       acetaminophen, aluminum-magnesium hydroxide-simethicone, cyclobenzaprine, dextrose 50%, dextrose 50%, glucagon (human recombinant), glucose, glucose, insulin aspart U-100, melatonin, naloxone, ondansetron, prochlorperazine, simethicone, sodium chloride 0.9%    Laboratory (all labs reviewed):  CBC:  Recent Labs   Lab 08/01/21 1906 08/01/21 1910 08/02/21 0433 01/19/22 0902 01/20/22  0443   WBC 16.12 H  --  13.88 H 11.80 15.00 H   Hemoglobin 16.2  --  14.9 14.6 14.7   POC Hematocrit  --  47  --   --   --    Hematocrit 46.2  --  45.6 44.2 45.0   Platelets 219  --  186 194 208       CHEMISTRIES:  Recent Labs   Lab 08/01/21 1906 08/02/21 0433 01/19/22 0902 01/20/22  0443   Glucose 221 H 259 H 331 H 214 H   Sodium 140 139 134 L 137   Potassium 4.0 4.3 4.2 4.1   BUN 10 11 12 11   Creatinine 1.0 1.0 0.8 0.8   eGFR if African American >60 >60 >60 >60   eGFR if non African American >60 >60 >60 >60   Calcium 9.7 9.6 10.0 9.4   Magnesium  --  1.9  --  2.1       CARDIAC BIOMARKERS:  Recent Labs   Lab 08/01/21 1906 01/19/22 0902 01/19/22  1202   CPK  --  104  --    Troponin I <0.006 <0.006 0.011       COAGS:  Recent Labs   Lab 08/01/21 1906 08/02/21 0433 01/20/22  0443   INR 1.0 1.0 1.0       LIPIDS/LFTS:  Recent Labs   Lab 08/01/21 1906 08/02/21 0433 01/19/22  0902 01/20/22  0443   Cholesterol 218 H  --   --  151   Triglycerides 281 H  --   --  266 H   HDL 33 L  --   --  30 L   LDL Cholesterol 128.8  --   --  67.8   Non-HDL Cholesterol 185  --   --  121   AST 30 22 35  --    ALT 40 34 49 H  --        BNP:  Recent Labs   Lab 01/19/22  0902   BNP <10       TSH:  Recent Labs   Lab 08/01/21  1906   TSH 1.885       Free T4:        Diagnostic Results:  ECG (personally  reviewed and interpreted tracing(s)):  1/19/22 -0751 SR 92, LAD/PRWP, similar to 8/1/21    Chest X-Ray (personally reviewed and interpreted image(s)): 1/19/22 NAD    Therese MPI: ordered    Echo: 8/2/21 (repeat ordered)  · The left ventricle is normal in size with concentric remodeling and normal systolic function.  · The estimated ejection fraction is 60%.  · Indeterminate left ventricular diastolic function.  · Normal right ventricular size with normal right ventricular systolic function.  · Normal central venous pressure (3 mmHg).  · The estimated PA systolic pressure is 10 mmHg.  · There is no pulmonary hypertension.        Assessment and Plan:     * Chest pain  Concerning sxs in pt with mult RF  EKG/trop neg  Check echo and Therese MPI today (pt unable to exercise)  OK for discharge assuming no significant findings.    Primary hypertension  Cont med rx    Type 2 diabetes mellitus  Mgmt per IM    Other hyperlipidemia  Cont statin  Consider dose titration given elev TG (or addition of fibrate)    History of TIA (transient ischemic attack)  8/2021        VTE Risk Mitigation (From admission, onward)         Ordered     enoxaparin injection 40 mg  Daily         01/19/22 1514     IP VTE HIGH RISK PATIENT  Once         01/19/22 1514     Place sequential compression device  Until discontinued         01/19/22 1514                Latrell Cody MD  Cardiology  [facility] [place] [facility] [place]    Addendum 1045am:    Echo 1/2022 (images personally reviewed and interpreted)  · The left ventricle is normal in size with mild concentric hypertrophy and normal systolic function.  · The estimated ejection fraction is 60%.  · Normal right ventricular size with normal right ventricular systolic function.    L MPI 1/20/22 (images personally reviewed and interpreted)    Normal myocardial perfusion scan. There is no evidence of myocardial ischemia or infarction.    The gated perfusion images showed an ejection fraction of 52% post  stress.    There is normal wall motion post stress.    No further inpat cardiac testing planned.  Cardiology will sign off, pls call with questions.  Dispo planning appropriate.

## 2022-01-20 NOTE — HOSPITAL COURSE
Placed in observation for chest pain. Troponin trend negative. NST no evidence of ischemia. 2 d echo normal EF and diastolic function. No chest pain during observation stay. Patient stable for discharge home. Wife will call Dr. Cody to arrange follow up appointment.

## 2022-01-20 NOTE — PLAN OF CARE
Problem: Adult Inpatient Plan of Care  Goal: Plan of Care Review  Outcome: Ongoing, Progressing  Goal: Patient-Specific Goal (Individualized)  Outcome: Ongoing, Progressing  Goal: Absence of Hospital-Acquired Illness or Injury  Outcome: Ongoing, Progressing  Goal: Optimal Comfort and Wellbeing  Outcome: Ongoing, Progressing  Goal: Readiness for Transition of Care  Outcome: Ongoing, Progressing     Problem: Bariatric Environmental Safety  Goal: Safety Maintained with Care  Outcome: Ongoing, Progressing     Problem: Infection  Goal: Absence of Infection Signs and Symptoms  Outcome: Ongoing, Progressing     Problem: Chest Pain  Goal: Resolution of Chest Pain Symptoms  Outcome: Ongoing, Progressing     Problem: Diabetes Comorbidity  Goal: Blood Glucose Level Within Targeted Range  Outcome: Ongoing, Progressing

## 2022-01-20 NOTE — NURSING
Discharge instructions, education, follow up appointments and medication instructions given to pt and wife and v/u. Discharged home with wife per private vehicle.

## 2022-01-20 NOTE — NURSING
Plan of care  continued with  no new concerns noted or voiced.Pt denies CP/Palpitation.s Continues to c/o left shoulder pain. New Flexeril order per Remote NISHI(WU Jesus) given and pt reports mild relief of pain.  NPO per order for a.m NST . Pt remained awake,alert and oriented  to person,place, time and situation. Rested intermittently in bed with eyes closed with  even rise and fall of chest noted. Afebrile.Remains on  room air with sat readings  within normal parameters.Up to toilet independently.  Safety measures  in place all shift ;bed in lowest position  with wheels locked and alarm on. Call bell within reach at all times;Pt able to make needs known to staff.  Verbalization of feelings promoted and validated. Will continue to monitor and follow treatment plan.

## 2022-01-20 NOTE — NURSING
Pt declined to take Scheduled Keppra . States he was transitioning to lamotrigine per doctor and tonight is his last dose.  Lamotrigine given as ordered.Will continue to monitor

## 2022-01-20 NOTE — ASSESSMENT & PLAN NOTE
Concerning sxs in pt with mult RF  EKG/trop neg  Check echo and Therese MPI today (pt unable to exercise)  OK for discharge assuming no significant findings.

## 2022-01-20 NOTE — SUBJECTIVE & OBJECTIVE
Past Medical History:   Diagnosis Date    Cigarette smoker     GERD (gastroesophageal reflux disease)     Hypertension     Loud snoring     Obese body habitus     Stroke 08/01/2021    TIA, RESIDUAL DEFICITS       Past Surgical History:   Procedure Laterality Date    DENIES SURGICAL  HISTORY         Review of patient's allergies indicates:  No Known Allergies    No current facility-administered medications on file prior to encounter.     Current Outpatient Medications on File Prior to Encounter   Medication Sig    ascorbic acid, vitamin C, (VITAMIN C) 100 MG tablet Take 500 mg by mouth once daily.    aspirin (ECOTRIN) 81 MG EC tablet Take 1 tablet (81 mg total) by mouth once daily.    aspirin-acetaminophen-caffeine 250-250-65 mg (EXCEDRIN MIGRAINE) 250-250-65 mg per tablet Take 1 tablet by mouth every 6 (six) hours as needed for Pain.    atorvastatin (LIPITOR) 20 MG tablet Take 1 tablet (20 mg total) by mouth once daily. (Patient taking differently: Take 40 mg by mouth once daily.)    azilsartan medoxomiL (EDARBI) 40 mg Tab Take 20 mg by mouth once daily.    b complex vitamins tablet Take 1 tablet by mouth once daily.    calcium carbonate (TUMS ORAL) Take by mouth.    Lactobacillus rhamnosus GG (CULTURELLE) 10 billion cell capsule Take 1 capsule by mouth once daily.    lamoTRIgine (LAMICTAL) 200 MG tablet Take 200 mg by mouth 2 (two) times daily.    levETIRAcetam (KEPPRA) 750 MG Tab levetiracetam 750 mg tablet   ONE BID    metFORMIN (GLUCOPHAGE) 500 MG tablet Take 1 tablet (500 mg total) by mouth 2 (two) times daily with meals.    omega-3/dha/epa/fish oil (OMEGA-3 FISH OIL ORAL) Take 500 mg by mouth once daily.    pantoprazole (PROTONIX) 40 MG tablet Take 40 mg by mouth once daily.    simethicone (MYLICON) 125 MG chewable tablet Take 125 mg by mouth every 6 (six) hours as needed for Flatulence.    ubrogepant (UBROGEPANT) 50 mg tablet Ubrelvy 50 mg tablet   Take 1 tablet as needed by oral route  as needed for 30 days.    zinc gluconate 50 mg tablet Take 25 mg by mouth once daily. Mon, Wed, Fri    clopidogreL (PLAVIX) 75 mg tablet Take 1 tablet (75 mg total) by mouth once daily. for 20 days    ergocalciferol (ERGOCALCIFEROL) 50,000 unit Cap Take 50,000 Units by mouth every 7 days. Mondays    LORazepam (ATIVAN) 0.5 MG tablet lorazepam 0.5 mg tablet   TAKE 1 TABLET BY MOUTH TWICE A DAY AS NEEDED     Family History     Problem Relation (Age of Onset)    Cancer Mother, Father, Brother        Tobacco Use    Smoking status: Current Every Day Smoker     Packs/day: 0.50     Types: Cigarettes    Smokeless tobacco: Never Used   Substance and Sexual Activity    Alcohol use: Yes     Alcohol/week: 0.8 standard drinks     Types: 1 Standard drinks or equivalent per week     Comment: occasionally    Drug use: No    Sexual activity: Yes     Review of Systems   Gastrointestinal: Negative for melena.   Genitourinary: Negative for hematuria.     Objective:     Vital Signs (Most Recent):  Temp: 98.4 °F (36.9 °C) (01/20/22 0449)  Pulse: 88 (01/20/22 0449)  Resp: 18 (01/20/22 0449)  BP: 119/64 (01/20/22 0449)  SpO2: (!) 94 % (01/20/22 0449) Vital Signs (24h Range):  Temp:  [97.2 °F (36.2 °C)-98.7 °F (37.1 °C)] 98.4 °F (36.9 °C)  Pulse:  [85-93] 88  Resp:  [13-22] 18  SpO2:  [94 %-97 %] 94 %  BP: (119-169)/(64-80) 119/64     Weight: 131.5 kg (290 lb)  Body mass index is 40.45 kg/m².    SpO2: (!) 94 %  O2 Device (Oxygen Therapy): room air    No intake or output data in the 24 hours ending 01/20/22 0638    Lines/Drains/Airways     Peripheral Intravenous Line                 Peripheral IV - Single Lumen 01/19/22 1001 20 G Right Antecubital <1 day              Exam unchanged vs 1/19/22  Physical Exam  Constitutional:       General: He is not in acute distress.     Appearance: He is well-developed. He is obese. He is not ill-appearing, toxic-appearing or diaphoretic.   HENT:      Head: Normocephalic and atraumatic.   Eyes:       General: No scleral icterus.     Extraocular Movements: Extraocular movements intact.      Conjunctiva/sclera: Conjunctivae normal.      Pupils: Pupils are equal, round, and reactive to light.   Neck:      Thyroid: No thyromegaly.      Vascular: No JVD.      Trachea: No tracheal deviation.   Cardiovascular:      Rate and Rhythm: Normal rate and regular rhythm.      Heart sounds: S1 normal and S2 normal. No murmur heard.  No friction rub. No gallop.    Pulmonary:      Effort: Pulmonary effort is normal. No respiratory distress.      Breath sounds: Normal breath sounds. No stridor. No wheezing, rhonchi or rales.   Chest:      Chest wall: No tenderness.   Abdominal:      General: There is no distension.      Palpations: Abdomen is soft.   Musculoskeletal:         General: No swelling or tenderness. Normal range of motion.      Cervical back: Normal range of motion and neck supple. No rigidity.      Right lower leg: No edema.      Left lower leg: No edema.   Skin:     General: Skin is warm and dry.      Coloration: Skin is not jaundiced.      Findings: No rash.   Neurological:      General: No focal deficit present.      Mental Status: He is alert and oriented to person, place, and time.      Cranial Nerves: No cranial nerve deficit.   Psychiatric:         Mood and Affect: Mood normal.         Behavior: Behavior normal.         Current Medications:   aspirin  81 mg Oral Daily    atorvastatin  40 mg Oral Daily    enoxaparin  40 mg Subcutaneous Daily    insulin detemir U-100  8 Units Subcutaneous QHS    lamoTRIgine  200 mg Oral BID    levETIRAcetam  500 mg Oral Nightly    losartan  50 mg Oral Daily    omega 3-dha-epa-fish oil   Oral Daily    pantoprazole  40 mg Oral Daily    polyethylene glycol  17 g Oral Daily       acetaminophen, aluminum-magnesium hydroxide-simethicone, cyclobenzaprine, dextrose 50%, dextrose 50%, glucagon (human recombinant), glucose, glucose, insulin aspart U-100, melatonin, naloxone,  ondansetron, prochlorperazine, simethicone, sodium chloride 0.9%    Laboratory (all labs reviewed):  CBC:  Recent Labs   Lab 08/01/21 1906 08/01/21 1910 08/02/21 0433 01/19/22  0902 01/20/22  0443   WBC 16.12 H  --  13.88 H 11.80 15.00 H   Hemoglobin 16.2  --  14.9 14.6 14.7   POC Hematocrit  --  47  --   --   --    Hematocrit 46.2  --  45.6 44.2 45.0   Platelets 219  --  186 194 208       CHEMISTRIES:  Recent Labs   Lab 08/01/21 1906 08/02/21 0433 01/19/22 0902 01/20/22  0443   Glucose 221 H 259 H 331 H 214 H   Sodium 140 139 134 L 137   Potassium 4.0 4.3 4.2 4.1   BUN 10 11 12 11   Creatinine 1.0 1.0 0.8 0.8   eGFR if African American >60 >60 >60 >60   eGFR if non African American >60 >60 >60 >60   Calcium 9.7 9.6 10.0 9.4   Magnesium  --  1.9  --  2.1       CARDIAC BIOMARKERS:  Recent Labs   Lab 08/01/21 1906 01/19/22  0902 01/19/22  1202   CPK  --  104  --    Troponin I <0.006 <0.006 0.011       COAGS:  Recent Labs   Lab 08/01/21 1906 08/02/21 0433 01/20/22  0443   INR 1.0 1.0 1.0       LIPIDS/LFTS:  Recent Labs   Lab 08/01/21 1906 08/02/21 0433 01/19/22  0902 01/20/22  0443   Cholesterol 218 H  --   --  151   Triglycerides 281 H  --   --  266 H   HDL 33 L  --   --  30 L   LDL Cholesterol 128.8  --   --  67.8   Non-HDL Cholesterol 185  --   --  121   AST 30 22 35  --    ALT 40 34 49 H  --        BNP:  Recent Labs   Lab 01/19/22 0902   BNP <10       TSH:  Recent Labs   Lab 08/01/21 1906   TSH 1.885       Free T4:        Diagnostic Results:  ECG (personally reviewed and interpreted tracing(s)):  1/19/22 -0751 SR 92, LAD/PRWP, similar to 8/1/21    Chest X-Ray (personally reviewed and interpreted image(s)): 1/19/22 NAD    Therese MPI: ordered    Echo: 8/2/21 (repeat ordered)  · The left ventricle is normal in size with concentric remodeling and normal systolic function.  · The estimated ejection fraction is 60%.  · Indeterminate left ventricular diastolic function.  · Normal right ventricular size with  normal right ventricular systolic function.  · Normal central venous pressure (3 mmHg).  · The estimated PA systolic pressure is 10 mmHg.  · There is no pulmonary hypertension.

## 2022-01-20 NOTE — PROGRESS NOTES
WRITTEN HEALTHCARE DISCHARGE INFORMATION      Things that YOU are RESPONSIBLE for to Manage Your Care At Home:     1. Getting your prescriptions filled.  2. Taking you medications as directed. DO NOT MISS ANY DOSES!  3. Going to your follow-up doctor appointments. This is important because it allows the doctor to monitor your progress and to determine if any changes need to be made to your treatment plan.     If you are unable to make your follow up appointments, please call the number listed and reschedule this appointment.      ____________HELP AT HOME____________________     Experiencing any SIGNS or SYMPTOMS: YOU CAN     Schedule a same day appopintment with your Primary Care Doctor or  you can call [facility] On Call Nurse Care Line for 24/7 assistance at [phone]     If you are experience any signs or symptoms that have become severe, Call 911 and come to your nearest Emergency Room.     Thank you for choosing [facility] and allowing us to care for you.   From your care management team:      You should receive a call from [facility] Discharge Department within 48-72 hours to help manage your care after discharge. Please try to make sure that you answer your phone for this important phone call.     Follow-up Information     Latrell Cody MD. Schedule an appointment as soon as possible for a visit on 2/4/2022.    Specialties: Cardiology, Interventional Cardiology  Why: appointment scheduled for February 4th at 1:40pm  Contact information:  [address] [facility] [address]  [address]  [facility] [address]  [phone]

## 2022-01-20 NOTE — NURSING
Pt continues to complain of Left shoulder pain.  WU Castillo notified of flexeril  Per pt request.Given as ordered. Will continue to monitor

## 2022-01-20 NOTE — DISCHARGE SUMMARY
"[facility] Medicine  Discharge Summary      Patient Name: Kristian Singh  MRN: [id number]  Patient Class: OP- Observation  Admission Date: 1/19/2022  Hospital Length of Stay: 0 days  Discharge Date and Time:  01/20/2022 4:58 PM  Attending Physician: Júnior Hwang MD  Discharging Provider: Jewell Drake NP  Primary Care Provider: Primary Doctor No      HPI:   Kristian Singh is a 45 y/o male with a PMHx of obesity, hypertension, Migraines, seizures, HLD, Diabetes Mellitus Type 2, and TIA (8/2021) who presented to the ED on 1/19 with a chief complaint of left shoulder pain and chest pain which began on 1/18.  He states that yesterday he felt like it was his rotator cuff that was acting up which was unusual because he has not had any increased physical activity. On the evening of 1/8 he experienced worsening of his chronic GERD with severe indigestion that required multiple doses of Tums and Maalox with mild relief.  He also reports being diffusely diaphoretic during these times. On the morning of 1/19 he developed associated chest pain with his left shoulder pain which radiates across his left chest to his substernal area.  He also reports pain radiating down his left arm. After arrival to ED he was no longer diaphoretic. Pt denies any associated nausea or shortness of breath. Family hx of premature coronary artery disease on both mother and fathers side. Pt states he has been compliant with daily aspirin. TTE from 8/2021 revealed "EF 60% and indeterminate left ventricular diastolic function." NKDA. Pt states he stopped smoking in August 2021.    ED Course:  EKG impression NSR, no STEMI. Troponin <0.006; BNP <10.  Negative for COVID-19. Chest xray impression no acute cardiopulmonary finding. Aspirin 162 mg administered.       * No surgery found *      Hospital Course:   Placed in observation for chest pain. Troponin trend negative. NST no evidence of ischemia. 2 d echo normal EF and " diastolic function. No chest pain during observation stay. Patient stable for discharge home. Wife will call Dr. Cody to arrange follow up appointment.        Goals of Care Treatment Preferences:  Code Status: Full Code      Consults:   Consults (From admission, onward)        Status Ordering Provider     Inpatient consult to Cardiology  Once        Provider:  Latrell Cody MD    Completed MARQUISE EMERY          No new Assessment & Plan notes have been filed under this hospital service since the last note was generated.  Service: Hospital Medicine    Final Active Diagnoses:    Diagnosis Date Noted POA    PRINCIPAL PROBLEM:  Chest pain [R07.9] 01/19/2022 Yes    Type 2 diabetes mellitus [E11.9] 01/19/2022 Yes    GERD (gastroesophageal reflux disease) [K21.9] 01/19/2022 Yes    Migraine without aura, not intractable [G43.009] 01/19/2022 Yes    Seizures [R56.9] 01/19/2022 Yes    Primary hypertension [I10] 01/19/2022 Yes    History of TIA (transient ischemic attack) [Z86.73] 01/19/2022 Not Applicable    Other hyperlipidemia [E78.49] 01/19/2022 Yes    Class 2 obesity in adult [E66.9] 08/01/2021 Yes     Chronic      Problems Resolved During this Admission:       Discharged Condition: good    Disposition: Home or Self Care    Follow Up:   Follow-up Information     Latrell Cody MD. Schedule an appointment as soon as possible for a visit on 2/4/2022.    Specialties: Cardiology, Interventional Cardiology  Why: appointment scheduled for February 4th at 1:40pm  Contact information:  [address]  [address] [place] [id number]  [phone]                       Patient Instructions:      Diet Cardiac     Diet diabetic     Notify your health care provider if you experience any of the following:  temperature >100.4     Notify your health care provider if you experience any of the following:  difficulty breathing or increased cough     Notify your health care provider if you experience any of the  following:  increased confusion or weakness     Activity as tolerated       Significant Diagnostic Studies: Labs: All labs within the past 24 hours have been reviewed    Pending Diagnostic Studies:     None         Medications:  Reconciled Home Medications:      Medication List      CHANGE how you take these medications    atorvastatin 40 MG tablet  Commonly known as: LIPITOR  Take 1 tablet (40 mg total) by mouth once daily.  What changed:   · medication strength  · how much to take        CONTINUE taking these medications    ascorbic acid (vitamin C) 100 MG tablet  Commonly known as: VITAMIN C  Take 500 mg by mouth once daily.     aspirin 81 MG EC tablet  Commonly known as: ECOTRIN  Take 1 tablet (81 mg total) by mouth once daily.     aspirin-acetaminophen-caffeine 250-250-65 mg 250-250-65 mg per tablet  Commonly known as: EXCEDRIN MIGRAINE  Take 1 tablet by mouth every 6 (six) hours as needed for Pain.     b complex vitamins tablet  Take 1 tablet by mouth once daily.     EDARBI 40 mg Tab  Generic drug: azilsartan medoxomiL  Take 20 mg by mouth once daily.     ergocalciferol 50,000 unit Cap  Commonly known as: ERGOCALCIFEROL  Take 50,000 Units by mouth every 7 days. Mondays     Lactobacillus rhamnosus GG 10 billion cell capsule  Commonly known as: CULTURELLE  Take 1 capsule by mouth once daily.     lamoTRIgine 200 MG tablet  Commonly known as: LAMICTAL  Take 200 mg by mouth 2 (two) times daily.     LORazepam 0.5 MG tablet  Commonly known as: ATIVAN  lorazepam 0.5 mg tablet   TAKE 1 TABLET BY MOUTH TWICE A DAY AS NEEDED     metFORMIN 500 MG tablet  Commonly known as: GLUCOPHAGE  Take 1 tablet (500 mg total) by mouth 2 (two) times daily with meals.     OMEGA-3 FISH OIL ORAL  Take 500 mg by mouth once daily.     pantoprazole 40 MG tablet  Commonly known as: PROTONIX  Take 40 mg by mouth once daily.     simethicone 125 MG chewable tablet  Commonly known as: MYLICON  Take 125 mg by mouth every 6 (six) hours as needed  for Flatulence.     TUMS ORAL  Take by mouth.     ubrogepant 50 mg tablet  Generic drug: ubrogepant  Ubrelvy 50 mg tablet   Take 1 tablet as needed by oral route as needed for 30 days.     zinc gluconate 50 mg tablet  Take 25 mg by mouth once daily. Mon, Wed, Fri        STOP taking these medications    clopidogreL 75 mg tablet  Commonly known as: PLAVIX     levETIRAcetam 750 MG Tab  Commonly known as: KEPPRA            Indwelling Lines/Drains at time of discharge:   Lines/Drains/Airways     None                 Time spent on the discharge of patient: 30 minutes         Jewell Drake NP  Department of Hospital Medicine  [facility] [place]

## 2022-01-20 NOTE — PLAN OF CARE
01/20/22 1202   Final Note   Assessment Type Final Discharge Note   Anticipated Discharge Disposition Home   Hospital Resources/Appts/Education Provided Appointments scheduled and added to AVS;Provided education on problems/symptoms using teachback   Post-Acute Status   Post-Acute Authorization Other   Other Status No Post-Acute Service Needs   Pts nurse Kumari notified via secure chat that the pt can d/c from CM standpoint

## 2024-07-27 ENCOUNTER — HOSPITAL ENCOUNTER (EMERGENCY)
Facility: HOSPITAL | Age: 49
Discharge: HOME | End: 2024-07-27
Attending: EMERGENCY MEDICINE
Payer: COMMERCIAL

## 2024-07-27 VITALS
WEIGHT: 254 LBS | HEIGHT: 72 IN | SYSTOLIC BLOOD PRESSURE: 121 MMHG | DIASTOLIC BLOOD PRESSURE: 75 MMHG | HEART RATE: 79 BPM | OXYGEN SATURATION: 96 % | TEMPERATURE: 98 F | RESPIRATION RATE: 18 BRPM | BODY MASS INDEX: 34.4 KG/M2

## 2024-07-27 DIAGNOSIS — T14.8XXA ABRASION: ICD-10-CM

## 2024-07-27 DIAGNOSIS — M79.603 ARM PAIN: ICD-10-CM

## 2024-07-27 DIAGNOSIS — S46.912A STRAIN OF LEFT SHOULDER, INITIAL ENCOUNTER: ICD-10-CM
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
## 2024-07-27 DIAGNOSIS — S50.12XA CONTUSION OF LEFT FOREARM, INITIAL ENCOUNTER: ICD-10-CM

## 2024-07-27 DIAGNOSIS — M79.669 PAIN IN SHIN: ICD-10-CM

## 2024-07-27 DIAGNOSIS — M79.662 PAIN IN LEFT SHIN: ICD-10-CM

## 2024-07-27 PROCEDURE — 99284 EMERGENCY DEPT VISIT MOD MDM: CPT | Mod: 25

## 2024-07-27 PROCEDURE — 25000003 PHARM REV CODE 250

## 2024-07-27 RX ORDER — MUPIROCIN 20 MG/G
1 OINTMENT TOPICAL
Status: COMPLETED | OUTPATIENT
Start: 2024-07-27 | End: 2024-07-27

## 2024-07-27 RX ORDER — LIDOCAINE 50 MG/G
1 PATCH TOPICAL DAILY
Qty: 15 PATCH | Refills: 0 | Status: SHIPPED | OUTPATIENT
Start: 2024-07-27 | End: 2024-07-27 | Stop reason: CLARIF

## 2024-07-27 RX ORDER — CYCLOBENZAPRINE HCL 10 MG
10 TABLET ORAL 3 TIMES DAILY PRN
Qty: 15 TABLET | Refills: 0 | Status: SHIPPED | OUTPATIENT
Start: 2024-07-27 | End: 2024-07-27

## 2024-07-27 RX ORDER — CYCLOBENZAPRINE HCL 10 MG
10 TABLET ORAL 3 TIMES DAILY PRN
Qty: 15 TABLET | Refills: 0 | Status: SHIPPED | OUTPATIENT
Start: 2024-07-27 | End: 2024-08-01

## 2024-07-27 RX ADMIN — MUPIROCIN 1 TUBE: 20 OINTMENT TOPICAL at 06:07

## 2024-07-27 NOTE — ED PROVIDER NOTES
Encounter Date: 7/27/2024       History     Chief Complaint   Patient presents with    Fall     Stairs collapsed and pt fell. Reports fall from 4 ft. Denies hitting head/ LOC. Pt reports pain left arm and leg.      The history is provided by the patient, medical records and the spouse.   48-year-old male past medical history of hypertension, GERD, stroke presenting to emergency department today complaining of left shoulder, left arm, left shin pain after a fall from 4 ft 1 hour prior to arrival.  States he was standing on some stairs which collapsed underneath him causing him to fall in the right side.  Patient denies head injury, no loss of consciousness, no nausea or vomiting.  States he was ambulatory however he has pain to the left shin and an abrasion to the shin.  Reports taking Flexeril and Tylenol prior to arrival with relief of pain.  No other complaints at this time.  Denies any headache, dizziness, weakness, numbness or tingling or other associated symptoms.  Review of patient's allergies indicates:  No Known Allergies  Past Medical History:   Diagnosis Date    Cigarette smoker     GERD (gastroesophageal reflux disease)     Hypertension     Loud snoring     Obese body habitus     Stroke 08/01/2021    TIA, RESIDUAL DEFICITS     Past Surgical History:   Procedure Laterality Date    DENIES SURGICAL  HISTORY       Family History   Problem Relation Name Age of Onset    Cancer Mother      Cancer Father      Cancer Brother       Social History     Tobacco Use    Smoking status: Every Day     Current packs/day: 0.50     Types: Cigarettes    Smokeless tobacco: Never   Substance Use Topics    Alcohol use: Yes     Alcohol/week: 0.8 standard drinks of alcohol     Types: 1 Standard drinks or equivalent per week     Comment: occasionally    Drug use: No     Review of Systems   Constitutional:  Negative for chills, diaphoresis, fatigue and fever.   HENT:  Negative for congestion, ear discharge, ear pain, rhinorrhea,  sore throat and trouble swallowing.    Eyes:  Negative for photophobia, redness and visual disturbance.   Respiratory:  Negative for cough and shortness of breath.    Cardiovascular:  Negative for chest pain, palpitations and leg swelling.   Gastrointestinal:  Negative for abdominal pain, diarrhea, nausea and vomiting.   Genitourinary:  Negative for difficulty urinating, dysuria, flank pain, frequency and hematuria.   Musculoskeletal:  Positive for arthralgias (Left shoulder) and myalgias (Left forearm, left shin). Negative for back pain, neck pain and neck stiffness.   Skin:  Positive for wound (Abrasion left shin). Negative for pallor and rash.   Neurological:  Negative for dizziness, weakness, light-headedness, numbness and headaches.   Hematological:  Does not bruise/bleed easily.       Physical Exam     Initial Vitals [07/27/24 1647]   BP Pulse Resp Temp SpO2   (!) 108/59 99 18 98.4 °F (36.9 °C) 95 %      MAP       --         Physical Exam    Nursing note and vitals reviewed.  Constitutional: He appears well-developed and well-nourished. He is not diaphoretic. He does not appear ill. No distress.   HENT:   Head: Normocephalic and atraumatic. Head is without raccoon's eyes, without Awad's sign, without abrasion, without contusion and without laceration.   Right Ear: Tympanic membrane, external ear and ear canal normal.   Left Ear: Tympanic membrane, external ear and ear canal normal.   Nose: Nose normal. No rhinorrhea, sinus tenderness, nasal deformity, septal deviation or nasal septal hematoma. No epistaxis.   Mouth/Throat: Uvula is midline, oropharynx is clear and moist and mucous membranes are normal.   Eyes: Conjunctivae and EOM are normal. Pupils are equal, round, and reactive to light. Right eye exhibits no discharge. Left eye exhibits no discharge. No scleral icterus.   Neck: Trachea normal. Neck supple.   Normal range of motion.   Full passive range of motion without pain.     Cardiovascular:  Normal  rate, regular rhythm, normal heart sounds, intact distal pulses and normal pulses.     Exam reveals no distant heart sounds and no friction rub.       Pulmonary/Chest: Effort normal and breath sounds normal. No respiratory distress. He exhibits no tenderness and no bony tenderness.   Abdominal: Abdomen is soft. Bowel sounds are normal. He exhibits no distension, no pulsatile midline mass and no mass. There is no splenomegaly. There is no abdominal tenderness.   No right CVA tenderness.  No left CVA tenderness. There is no rebound and no guarding.   Musculoskeletal:         General: Normal range of motion.      Right shoulder: Normal.      Left shoulder: Tenderness and bony tenderness present. No swelling, deformity, effusion, laceration or crepitus. Normal range of motion. Normal strength. Normal pulse.      Right elbow: Normal.      Left elbow: Normal.      Left forearm: Swelling and tenderness (Distal noted contusion) present. No edema, deformity, lacerations or bony tenderness.      Right wrist: Normal.      Left wrist: Normal.      Right hand: Normal.      Left hand: Normal.      Cervical back: Normal, full passive range of motion without pain, normal range of motion and neck supple. No edema, erythema or rigidity. No spinous process tenderness or muscular tenderness. Normal range of motion.      Thoracic back: Normal.      Lumbar back: Normal.      Right hip: Normal.      Left hip: Normal.      Right knee: Normal.      Left knee: Normal.      Right lower leg: Normal.      Left lower leg: Tenderness (Over abrasion site) present. No swelling, deformity, lacerations or bony tenderness. No edema.      Right ankle: Normal.      Left ankle: Normal.      Right foot: Normal.      Left foot: Normal.        Legs:       Comments: Patient was full range of motion of both upper and lower extremities bilaterally with 5/5 strength 2+ distal pulses neurovascularly intact.     Neurological: He is alert and oriented to person,  place, and time. He has normal strength. No cranial nerve deficit or sensory deficit. He displays a negative Romberg sign. Coordination and gait normal.   Skin: Skin is warm and dry. Capillary refill takes less than 2 seconds. No bruising, no ecchymosis and no rash noted. No erythema.   Psychiatric: He has a normal mood and affect. His speech is normal and behavior is normal. Thought content normal.         ED Course   Procedures  Labs Reviewed - No data to display       Imaging Results              X-Ray Tibia Fibula 2 View Left (Final result)  Result time 07/27/24 19:01:07      Final result by Ike Cruz MD (07/27/24 19:01:07)                   Impression:      No acute osseous abnormality identified.      Electronically signed by: Ike Cruz MD  Date:    07/27/2024  Time:    19:01               Narrative:    EXAMINATION:  XR TIBIA FIBULA 2 VIEW LEFT    CLINICAL HISTORY:  Pain in unspecified lower leg    TECHNIQUE:  AP and lateral views of the left tibia and fibula were performed.    COMPARISON:  None.    FINDINGS:  No evidence of acute displaced fracture, dislocation, or osseous destructive process.  Knee and ankle joint spaces are preserved.                                       X-Ray Forearm Left (Final result)  Result time 07/27/24 19:00:07      Final result by Ike Cruz MD (07/27/24 19:00:07)                   Impression:      No acute osseous abnormality identified.      Electronically signed by: Ike Cruz MD  Date:    07/27/2024  Time:    19:00               Narrative:    EXAMINATION:  XR FOREARM LEFT    CLINICAL HISTORY:  Pain in arm, unspecified    TECHNIQUE:  AP and lateral views of the left forearm were performed.    COMPARISON:  None    FINDINGS:  No evidence of acute displaced fracture, dislocation, or osseous destructive process.  There is posterior olecranon spurring.  No significant elbow joint effusion.  No radiopaque retained foreign body seen.                                        X-Ray Shoulder 2 or More Views Left (Final result)  Result time 07/27/24 18:58:42      Final result by Ike Cruz MD (07/27/24 18:58:42)                   Impression:      No acute osseous abnormality identified.      Electronically signed by: Ike Cruz MD  Date:    07/27/2024  Time:    18:58               Narrative:    EXAMINATION:  XR SHOULDER COMPLETE 2 OR MORE VIEWS LEFT    CLINICAL HISTORY:  shoulder pain;    TECHNIQUE:  Two views of the left shoulder were performed.    COMPARISON  None    FINDINGS:  No evidence of acute displaced fracture, dislocation, or osseous destructive process.                                       Medications   mupirocin 2 % ointment 1 Tube (1 Tube Topical (Top) Given 7/27/24 1835)     Medical Decision Making  48-year-old male past medical history of hypertension, GERD, stroke presenting to emergency department today complaining of left shoulder, left arm, left shin pain after a fall from 4 ft 1 hour prior to arrival.  States he was standing on some stairs which collapsed underneath him causing him to fall in the right side.  Patient denies head injury, no loss of consciousness, no nausea or vomiting.  States he was ambulatory however he has pain to the left shin and an abrasion to the shin.  Reports taking Flexeril and Tylenol prior to arrival with relief of pain.  No other complaints at this time.  Denies any headache, dizziness, weakness, numbness or tingling or other associated symptoms.  Patient's chart and medical history reviewed.  Patient's vitals reviewed.  They are afebrile, no respiratory distress, nontoxic-appearing in the ED.  Differential diagnosis is considered the following.  - Septic Arthritis, Gout, Osteomyelitis: considered with pain, although unlikely without overlying erythema, patient is afebrile  - Fracture/Dislocation: considered with pain, imaging ordered for further evaluation  - Contusion/Sprain/Strain: considered with pain with ROM  and noted contusions on exam  - Compartment Syndrome: unlikely with 2+ distal pulses, no pallor, no paresthesias, no woody induration.   Patient was abrasion is cleaned mupirocin applied bandage.  Discussed rice therapy.  Pending x-rays for osseous evaluation.  No acute osseous abnormalities noted on patient's x-rays.  We will discharged home with Flexeril for pain.  Discussed rice therapy.  Wound care instructions given.  At this time I'll discharge home to follow up with primary care physician in the next 1-2 days for further evaluation.  If the pain continues the pt will need to see ED for further evaluation.  The patient is comfortable with this plan and comfortable going home at this time. After taking into careful account the historical factors and physical exam findings of the patient's presentation today, in conjunction with the empirical and objective data obtained on ED workup, no acute emergent medical condition has been identified. The patient appears to be low risk for an emergent medical condition and I feel it is safe and appropriate at this time for the patient to be discharged to follow-up as detailed in their discharge instructions for reevaluation and possible continued outpatient workup and management. I have discussed the specifics of the workup with the patient and the patient has verbalized understanding of the details of the workup, the diagnosis, the treatment plan, and the need for outpatient follow-up.  Although the patient has no emergent etiology today this does not preclude the development of an emergent condition so in addition, I have advised the patient that they can return to the ED and/or activate EMS at any time with worsening of their symptoms, change of their symptoms, or with any other medical complaint.  The patient remained comfortable and stable during their visit in the ED.  Discharge and follow-up instructions discussed with the patient who expressed understanding and  willingness to comply with my recommendations. I discussed with the patient/family the diagnosis, treatment plan, indications for return to the emergency department, and for expected follow-up. Please follow up with your primary doctor in 1-2 days and return to the ED in any new, worsening, or continued symptoms. The patient/family verbalized an understanding. The patient/family is asked if there are any questions or concerns. We discuss the case, until all issues are addressed to the patient/family's satisfaction. Patient/family understands and is agreeable to the plan.    REINALDO HOFFMAN PA-C    DISCLAIMER: This note was prepared with EXO5 voice recognition transcription software. Garbled syntax, mangled pronouns, and other bizarre constructions may be attributed to that software system.      Amount and/or Complexity of Data Reviewed  Radiology: ordered.    Risk  Prescription drug management.                                      Clinical Impression:  Final diagnoses:  [M79.669] Pain in shin  [M79.603] Arm pain  [T14.8XXA] Abrasion  [S50.12XA] Contusion of left forearm, initial encounter  [S46.912A] Strain of left shoulder, initial encounter  [M79.662] Pain in left shin  [W19.XXXA] Fall, initial encounter (Primary)          ED Disposition Condition    Discharge Stable          ED Prescriptions       Medication Sig Dispense Start Date End Date Auth. Provider    LIDOcaine (LIDODERM) 5 %  (Status: Discontinued) Place 1 patch onto the skin once daily. Apply patch for 12 hours and then leave off for 12 hours 15 patch 7/27/2024 7/27/2024 Reinaldo Hoffman PA-C    cyclobenzaprine (FLEXERIL) 10 MG tablet Take 1 tablet (10 mg total) by mouth 3 (three) times daily as needed for Muscle spasms. 15 tablet 7/27/2024 8/1/2024 Reinaldo Hoffman PA-C          Follow-up Information       Follow up With Specialties Details Why Contact [facility] -  Schedule an appointment as soon as possible for a visit in 1  day for follow up if you do not currently have a  [address] [place] [address]  [phone]      Your primary care physician  Schedule an appointment as soon as possible for a visit in 1 day for follow up              Reinaldo Bermudez PA-C  07/27/24 3717

## 2024-07-27 NOTE — ED TRIAGE NOTES
Pt reports to ED due to a fall that occurred around. Pt reports stairs broke and pt went through the stairs. Pt complains or left ankle pan, left wrist pain and right shoulder pain. Pt took tylenol at 1310

## 2024-07-28 NOTE — DISCHARGE INSTRUCTIONS
Thank you for coming to our Emergency Department today. It is important to remember that some problems or medical conditions are difficult to diagnose and may not be found or addressed during your Emergency Department visit.  These conditions often start with non-specific symptoms and can only be diagnosed on follow up visits with your primary care physician or specialist when the symptoms continue or change. Please remember that all medical conditions can change, and we cannot predict how you will be feeling tomorrow or the next day. Return to the ER with any questions/concerns, new/concerning symptoms including fever, chest pain, shortness of breath, loss of consciousness, dizziness, weakness, worsening symptoms, failure to improve, or any other concerns. Also, please follow up with your Primary Care Physician and/or Pediatrician in the next 1-2 days to review your ED visit in entirety and for re-evaluation.   Be sure to follow up with your primary care doctor and review all labs/imaging/tests that were performed during your ER visit with them. It is very common for us to identify non-emergent incidental findings which must be followed up with your primary care physician.  Some labs/imaging/tests may be outside of the normal range, and require non-emergent follow-up and/or further investigation/treatment/procedures/testing to help diagnose/exclude/prevent complications or other potentially serious medical conditions. Some abnormalities may not have been discussed or addressed during your ER visit. Some lab results may not return during your ER visit but can be accessible by downloading the free [facility] My[facility] verito or by visiting [email or web address] . It is important for you to review all labs/imaging/tests which are outside of the normal range with your physician.  An ER visit does not replace a primary care visit, and many screening tests or follow-up tests cannot be ordered by an ER doctor or performed by  the ER. Some tests may even require pre-approval.  If you do not have a primary care doctor, you may contact the one listed on your discharge paperwork or you may also call the [facility] Appointment Desk at [phone] , or [address] [place] [address] at  [phone] to schedule an appointment, or establish care with a primary care doctor or even a specialist and to obtain information about local resources. It is important to your health that you have a primary care doctor.  Please take all medications as directed. We have done our best to select a medication for you that will treat your condition however, all medications may potentially have side-effects and it is impossible to predict which medications may give you side-effects or what those side-effects (if any) those medications may give you.  If you feel that you are having a negative effect or side-effect of any medication you should stop taking those medications immediately and seek medical attention. If you feel that you are having a life-threatening reaction call 911.  Do not drive, swim, climb to height, take a bath, operate heavy machinery, drink alcohol or take potentially sedating medications, sign any legal documents or make any important decisions for 24 hours if you have received any pain medications, sedatives or mood altering drugs during your ER visit or within 24 hours of taking them if they have been prescribed to you.   You can find additional resources for Dentists, hearing aids, durable medical equipment, low cost pharmacies and other resources at [email or web address]  Patient agrees with this plan. Discussed with her strict return precautions, they verbalized understanding. Patient is stable for discharge.   § Please take all medication as prescribed.